# Patient Record
Sex: FEMALE | Race: WHITE | NOT HISPANIC OR LATINO | URBAN - METROPOLITAN AREA
[De-identification: names, ages, dates, MRNs, and addresses within clinical notes are randomized per-mention and may not be internally consistent; named-entity substitution may affect disease eponyms.]

---
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
## 2019-11-18 ENCOUNTER — TRANSCRIPTION ENCOUNTER (OUTPATIENT)
Age: 19
End: 2019-11-18

## 2020-01-15 ENCOUNTER — EMERGENCY (EMERGENCY)
Facility: HOSPITAL | Age: 20
LOS: 1 days | Discharge: HOME | End: 2020-01-15
Attending: EMERGENCY MEDICINE | Admitting: EMERGENCY MEDICINE
Payer: COMMERCIAL

## 2020-01-15 ENCOUNTER — APPOINTMENT (OUTPATIENT)
Dept: INTERNAL MEDICINE | Facility: CLINIC | Age: 20
End: 2020-01-15
Payer: COMMERCIAL

## 2020-01-15 VITALS
TEMPERATURE: 98.2 F | BODY MASS INDEX: 45.75 KG/M2 | RESPIRATION RATE: 14 BRPM | WEIGHT: 268 LBS | OXYGEN SATURATION: 97 % | SYSTOLIC BLOOD PRESSURE: 140 MMHG | HEIGHT: 64 IN | DIASTOLIC BLOOD PRESSURE: 80 MMHG | HEART RATE: 102 BPM

## 2020-01-15 VITALS
TEMPERATURE: 98 F | HEIGHT: 66 IN | SYSTOLIC BLOOD PRESSURE: 152 MMHG | DIASTOLIC BLOOD PRESSURE: 90 MMHG | OXYGEN SATURATION: 99 % | WEIGHT: 268.08 LBS | RESPIRATION RATE: 18 BRPM | HEART RATE: 113 BPM

## 2020-01-15 VITALS
DIASTOLIC BLOOD PRESSURE: 85 MMHG | HEART RATE: 97 BPM | SYSTOLIC BLOOD PRESSURE: 122 MMHG | RESPIRATION RATE: 16 BRPM | OXYGEN SATURATION: 100 % | TEMPERATURE: 98 F

## 2020-01-15 DIAGNOSIS — Z90.49 ACQUIRED ABSENCE OF OTHER SPECIFIED PARTS OF DIGESTIVE TRACT: Chronic | ICD-10-CM

## 2020-01-15 DIAGNOSIS — Z98.890 OTHER SPECIFIED POSTPROCEDURAL STATES: Chronic | ICD-10-CM

## 2020-01-15 DIAGNOSIS — Z90.89 ACQUIRED ABSENCE OF OTHER ORGANS: Chronic | ICD-10-CM

## 2020-01-15 LAB
ALBUMIN SERPL ELPH-MCNC: 4.6 G/DL — SIGNIFICANT CHANGE UP (ref 3.3–5)
ALP SERPL-CCNC: 68 U/L — SIGNIFICANT CHANGE UP (ref 40–120)
ALT FLD-CCNC: 116 U/L — HIGH (ref 12–78)
ANION GAP SERPL CALC-SCNC: 9 MMOL/L — SIGNIFICANT CHANGE UP (ref 5–17)
APPEARANCE UR: CLEAR — SIGNIFICANT CHANGE UP
AST SERPL-CCNC: 57 U/L — HIGH (ref 15–37)
BACTERIA # UR AUTO: ABNORMAL
BASOPHILS # BLD AUTO: 0.04 K/UL — SIGNIFICANT CHANGE UP (ref 0–0.2)
BASOPHILS NFR BLD AUTO: 0.4 % — SIGNIFICANT CHANGE UP (ref 0–2)
BILIRUB SERPL-MCNC: 0.4 MG/DL — SIGNIFICANT CHANGE UP (ref 0.2–1.2)
BILIRUB UR-MCNC: NEGATIVE — SIGNIFICANT CHANGE UP
BUN SERPL-MCNC: 5 MG/DL — LOW (ref 7–23)
CALCIUM SERPL-MCNC: 10.1 MG/DL — SIGNIFICANT CHANGE UP (ref 8.5–10.1)
CHLORIDE SERPL-SCNC: 108 MMOL/L — SIGNIFICANT CHANGE UP (ref 96–108)
CO2 SERPL-SCNC: 23 MMOL/L — SIGNIFICANT CHANGE UP (ref 22–31)
COLOR SPEC: SIGNIFICANT CHANGE UP
CREAT SERPL-MCNC: 0.74 MG/DL — SIGNIFICANT CHANGE UP (ref 0.5–1.3)
DIFF PNL FLD: NEGATIVE — SIGNIFICANT CHANGE UP
EOSINOPHIL # BLD AUTO: 0.2 K/UL — SIGNIFICANT CHANGE UP (ref 0–0.5)
EOSINOPHIL NFR BLD AUTO: 1.8 % — SIGNIFICANT CHANGE UP (ref 0–6)
EPI CELLS # UR: SIGNIFICANT CHANGE UP
GLUCOSE SERPL-MCNC: 91 MG/DL — SIGNIFICANT CHANGE UP (ref 70–99)
GLUCOSE UR QL: NEGATIVE — SIGNIFICANT CHANGE UP
HCT VFR BLD CALC: 39.6 % — SIGNIFICANT CHANGE UP (ref 34.5–45)
HGB BLD-MCNC: 13.2 G/DL — SIGNIFICANT CHANGE UP (ref 11.5–15.5)
IMM GRANULOCYTES NFR BLD AUTO: 0.3 % — SIGNIFICANT CHANGE UP (ref 0–1.5)
KETONES UR-MCNC: ABNORMAL
LEUKOCYTE ESTERASE UR-ACNC: NEGATIVE — SIGNIFICANT CHANGE UP
LYMPHOCYTES # BLD AUTO: 2.94 K/UL — SIGNIFICANT CHANGE UP (ref 1–3.3)
LYMPHOCYTES # BLD AUTO: 26.1 % — SIGNIFICANT CHANGE UP (ref 13–44)
MCHC RBC-ENTMCNC: 28.9 PG — SIGNIFICANT CHANGE UP (ref 27–34)
MCHC RBC-ENTMCNC: 33.3 GM/DL — SIGNIFICANT CHANGE UP (ref 32–36)
MCV RBC AUTO: 86.8 FL — SIGNIFICANT CHANGE UP (ref 80–100)
MONOCYTES # BLD AUTO: 0.73 K/UL — SIGNIFICANT CHANGE UP (ref 0–0.9)
MONOCYTES NFR BLD AUTO: 6.5 % — SIGNIFICANT CHANGE UP (ref 2–14)
NEUTROPHILS # BLD AUTO: 7.32 K/UL — SIGNIFICANT CHANGE UP (ref 1.8–7.4)
NEUTROPHILS NFR BLD AUTO: 64.9 % — SIGNIFICANT CHANGE UP (ref 43–77)
NITRITE UR-MCNC: NEGATIVE — SIGNIFICANT CHANGE UP
NRBC # BLD: 0 /100 WBCS — SIGNIFICANT CHANGE UP (ref 0–0)
PH UR: 5 — SIGNIFICANT CHANGE UP (ref 5–8)
PLATELET # BLD AUTO: 150 K/UL — SIGNIFICANT CHANGE UP (ref 150–400)
POTASSIUM SERPL-MCNC: 3.7 MMOL/L — SIGNIFICANT CHANGE UP (ref 3.5–5.3)
POTASSIUM SERPL-SCNC: 3.7 MMOL/L — SIGNIFICANT CHANGE UP (ref 3.5–5.3)
PROT SERPL-MCNC: 8.9 G/DL — HIGH (ref 6–8.3)
PROT UR-MCNC: NEGATIVE — SIGNIFICANT CHANGE UP
RBC # BLD: 4.56 M/UL — SIGNIFICANT CHANGE UP (ref 3.8–5.2)
RBC # FLD: 13.1 % — SIGNIFICANT CHANGE UP (ref 10.3–14.5)
SODIUM SERPL-SCNC: 140 MMOL/L — SIGNIFICANT CHANGE UP (ref 135–145)
SP GR SPEC: 1 — LOW (ref 1.01–1.02)
UROBILINOGEN FLD QL: NEGATIVE — SIGNIFICANT CHANGE UP
WBC # BLD: 11.26 K/UL — HIGH (ref 3.8–10.5)
WBC # FLD AUTO: 11.26 K/UL — HIGH (ref 3.8–10.5)

## 2020-01-15 PROCEDURE — 85027 COMPLETE CBC AUTOMATED: CPT

## 2020-01-15 PROCEDURE — 99283 EMERGENCY DEPT VISIT LOW MDM: CPT

## 2020-01-15 PROCEDURE — 81001 URINALYSIS AUTO W/SCOPE: CPT

## 2020-01-15 PROCEDURE — 99213 OFFICE O/P EST LOW 20 MIN: CPT

## 2020-01-15 PROCEDURE — 80053 COMPREHEN METABOLIC PANEL: CPT

## 2020-01-15 PROCEDURE — 82962 GLUCOSE BLOOD TEST: CPT

## 2020-01-15 PROCEDURE — 36415 COLL VENOUS BLD VENIPUNCTURE: CPT

## 2020-01-15 RX ORDER — HYDROXYZINE PAMOATE 25 MG/1
25 CAPSULE ORAL
Qty: 120 | Refills: 0 | Status: COMPLETED | COMMUNITY
Start: 2019-12-07

## 2020-01-15 RX ORDER — PAROXETINE HYDROCHLORIDE 10 MG/1
10 TABLET, FILM COATED ORAL
Qty: 30 | Refills: 0 | Status: COMPLETED | COMMUNITY
Start: 2019-12-07

## 2020-01-15 RX ORDER — PAROXETINE HYDROCHLORIDE 20 MG/1
20 TABLET, FILM COATED ORAL
Qty: 30 | Refills: 0 | Status: COMPLETED | COMMUNITY
Start: 2019-12-28

## 2020-01-15 RX ORDER — ALPRAZOLAM 0.25 MG
0.5 TABLET ORAL ONCE
Refills: 0 | Status: DISCONTINUED | OUTPATIENT
Start: 2020-01-15 | End: 2020-01-15

## 2020-01-15 RX ADMIN — Medication 0.5 MILLIGRAM(S): at 19:47

## 2020-01-15 NOTE — HISTORY OF PRESENT ILLNESS
[FreeTextEntry8] :  19F presents with episode of hypoglycemia. Reported white fingertips and dizziness. Took her BS which was 70. Episode improved after eating. She is on metformin and januvia. Notes she has been eating healthier and losing weight. \par

## 2020-01-15 NOTE — ED ADULT NURSE NOTE - NSIMPLEMENTINTERV_GEN_ALL_ED
Implemented All Universal Safety Interventions:  [place] to call system. Call bell, personal items and telephone within reach. Instruct patient to call for assistance. Room bathroom lighting operational. Non-slip footwear when patient is off stretcher. Physically safe environment: no spills, clutter or unnecessary equipment. Stretcher in lowest position, wheels locked, appropriate side rails in place.

## 2020-01-15 NOTE — ED PROVIDER NOTE - PATIENT PORTAL LINK FT
You can access the FollowMyHealth Patient Portal offered by [facility] by registering at the following website: [email or web address]. By joining [employer]’s FollowMyHealth portal, you will also be able to view your health information using other applications (apps) compatible with our system.

## 2020-01-15 NOTE — ASSESSMENT
[FreeTextEntry1] : DM2: Recommended she hold januvia until A1c resulted. Treatment based on results. \par

## 2020-01-15 NOTE — ED ADULT NURSE NOTE - OBJECTIVE STATEMENT
Pt p/w intermittent hypoglycemia x 2 days. per patient has had low blood sugars in the 60s despite not taking her hyperglycemia meds. Pt reports that she has also been working out and fasting intermittently. Seen by PMD today told to stop taking januvia, per patient she ate an orange today and a salad and after several hours her blood sugar was 63.

## 2020-01-15 NOTE — ED ADULT NURSE NOTE - PAIN RATING/NUMBER SCALE (0-10): REST
14 y/o F with angioedema v anaphylaxis    f/u allergy/immunology consult  steroids per A&I  send labs per A&I  change H2 blocker, benadryl to PO  wean O2 as tolerated  PO ad tiffanie regular diet  DC IVF  transfer to  0

## 2020-01-15 NOTE — ED PROVIDER NOTE - PROGRESS NOTE DETAILS
Pt states she is feeling anxious. Takes Klonopin at home. Will given dose of Xanax here while workup in progress. Pt feeling better. Labs unremarkable. Pt to f/u with endo/pcp tomorrow.  Results and plan discussed with pt and father- verbalize agreement and understanding of plan. Stable for DC home. No emergent concerns at this time.

## 2020-01-15 NOTE — ED PROVIDER NOTE - OBJECTIVE STATEMENT
18 yo F PMHx HTN, DM2, obesity presents to ED c/o hypoglycemia x a few days. Pt reports multiple low blood glucose levels. Admits to intermittently fasting. Today pt states she had an orange in the AM and a salad at about 3:30. Throughout the day FS noted to be around high 60s. Contacted her endocrinology to see if she could come into the office for evaluation/possible medication adjustment but was instead advised to come into the ED. Pt currently asymptomatic. FS in triage 90  PCP- Claudio Burgos

## 2020-01-15 NOTE — ED PROVIDER NOTE - NSFOLLOWUPINSTRUCTIONS_ED_ALL_ED_FT
Follow up with your PCP/endocrinologist tomorrow.  Make sure that you eat at regular intervals.  Return to ED immediately for new or worsening symptoms.

## 2020-01-15 NOTE — REVIEW OF SYSTEMS
[Fever] : no fever [Chills] : no chills [Night Sweats] : no night sweats [Discharge] : no discharge [Itching] : no itching [Vision Problems] : no vision problems [Chest Pain] : no chest pain [Palpitations] : no palpitations [Lower Ext Edema] : no lower extremity edema [Shortness Of Breath] : no shortness of breath [Wheezing] : no wheezing [Dyspnea on Exertion] : no dyspnea on exertion [Cough] : no cough [Abdominal Pain] : no abdominal pain [Nausea] : no nausea [Constipation] : no constipation [Diarrhea] : diarrhea [Vomiting] : no vomiting [Dysuria] : no dysuria

## 2020-01-15 NOTE — PHYSICAL EXAM
[Normal Sclera/Conjunctiva] : normal sclera/conjunctiva [No Acute Distress] : no acute distress [PERRL] : pupils equal round and reactive to light [EOMI] : extraocular movements intact [No Respiratory Distress] : no respiratory distress  [No Accessory Muscle Use] : no accessory muscle use [Clear to Auscultation] : lungs were clear to auscultation bilaterally [Normal Rate] : normal rate  [Regular Rhythm] : with a regular rhythm [Normal S1, S2] : normal S1 and S2 [Soft] : abdomen soft [Non Tender] : non-tender [Non-distended] : non-distended [Normal Bowel Sounds] : normal bowel sounds

## 2020-01-15 NOTE — ED PROVIDER NOTE - ATTENDING CONTRIBUTION TO CARE
I have personally performed a face to face diagnostic evaluation on this patient.  I have reviewed the PA note and agree with the history, exam, and plan of care, except as noted.  History and Exam by me shows patient c/o hypoglycemia, states she has been checking her finger stick for past few days, has read 72, 83 and today 62, patient states she has been dieting, going to the gym, today patient feeling anxious, hyperventilating, fingernails turning blue, patient obese, alert and oriented, anxious, tearful, heart tachycardic, lungs clear, abdomen soft, no pedal edema, f/u labs, feed the patient, xanax, re-eval.

## 2020-01-16 LAB
ALBUMIN SERPL ELPH-MCNC: 5.3 G/DL
ALP BLD-CCNC: 62 U/L
ALT SERPL-CCNC: 91 U/L
ANION GAP SERPL CALC-SCNC: 17 MMOL/L
AST SERPL-CCNC: 42 U/L
BASOPHILS # BLD AUTO: 0.04 K/UL
BASOPHILS NFR BLD AUTO: 0.4 %
BILIRUB SERPL-MCNC: 0.5 MG/DL
BUN SERPL-MCNC: 6 MG/DL
CALCIUM SERPL-MCNC: 10.5 MG/DL
CHLORIDE SERPL-SCNC: 101 MMOL/L
CHOLEST SERPL-MCNC: 133 MG/DL
CHOLEST/HDLC SERPL: 3.7 RATIO
CO2 SERPL-SCNC: 20 MMOL/L
CREAT SERPL-MCNC: 0.69 MG/DL
CREAT SPEC-SCNC: 77 MG/DL
EOSINOPHIL # BLD AUTO: 0.23 K/UL
EOSINOPHIL NFR BLD AUTO: 2 %
ESTIMATED AVERAGE GLUCOSE: 111 MG/DL
GLUCOSE SERPL-MCNC: 96 MG/DL
HBA1C MFR BLD HPLC: 5.5 %
HCT VFR BLD CALC: 39.3 %
HDLC SERPL-MCNC: 36 MG/DL
HGB BLD-MCNC: 13.3 G/DL
IMM GRANULOCYTES NFR BLD AUTO: 0.2 %
INSULIN SERPL-MCNC: 44.3 UU/ML
LDLC SERPL CALC-MCNC: 77 MG/DL
LYMPHOCYTES # BLD AUTO: 2.69 K/UL
LYMPHOCYTES NFR BLD AUTO: 23.8 %
MAN DIFF?: NORMAL
MCHC RBC-ENTMCNC: 29.8 PG
MCHC RBC-ENTMCNC: 33.8 GM/DL
MCV RBC AUTO: 87.9 FL
MICROALBUMIN 24H UR DL<=1MG/L-MCNC: 2.5 MG/DL
MICROALBUMIN/CREAT 24H UR-RTO: 32 MG/G
MONOCYTES # BLD AUTO: 0.81 K/UL
MONOCYTES NFR BLD AUTO: 7.2 %
NEUTROPHILS # BLD AUTO: 7.5 K/UL
NEUTROPHILS NFR BLD AUTO: 66.4 %
PLATELET # BLD AUTO: 203 K/UL
POTASSIUM SERPL-SCNC: 4.4 MMOL/L
PROT SERPL-MCNC: 7.9 G/DL
RBC # BLD: 4.47 M/UL
RBC # FLD: 13.1 %
SODIUM SERPL-SCNC: 139 MMOL/L
TRIGL SERPL-MCNC: 99 MG/DL
WBC # FLD AUTO: 11.29 K/UL

## 2020-02-05 NOTE — PATIENT PROFILE PEDIATRIC. - FUNCTIONAL SCREEN CURRENT LEVEL: COMMUNICATION, MLM
Skin normal color for race, warm, dry and intact. No evidence of rash. (0) understands/communicates without difficulty

## 2020-02-22 NOTE — ED PEDIATRIC TRIAGE NOTE - TEMPERATURE IN FAHRENHEIT (DEGREES F)
24 y.o. female with past medical history significant for asthma who presents from private vehicle with chief complaint of abdominal pain. Pt c/o sharp LUQ and LLQ abdominal pain that began on 20. Pt notes accompanying nausea, dysuria, and back pain. Pt also reports diarrhea. Pt states she drank a magnesium citrate and passed a bowel movement, which did not relieve her abdominal pain. Pt states she has an IUD in place. Pt mentions she is \"prone to UTI and yeast infections\". Pt endorses previous surgical hx of . Pt denies vomiting, hematuria, or urinary frequency. There are no other acute medical concerns at this time. Significant FMHx: Family hx of crohn's disease and diverticulitis. Note written by Carissa Mtz, as dictated by Jayce Beckham MD 4:48 PM        The history is provided by the patient and a relative. No  was used. Past Medical History:   Diagnosis Date    Asthma     Nexplanon removal     Inserted 2015 and Removed 2016       History reviewed. No pertinent surgical history.       Family History:   Problem Relation Age of Onset    Hypertension Mother        Social History     Socioeconomic History    Marital status: SINGLE     Spouse name: Not on file    Number of children: Not on file    Years of education: Not on file    Highest education level: Not on file   Occupational History    Not on file   Social Needs    Financial resource strain: Not on file    Food insecurity:     Worry: Not on file     Inability: Not on file    Transportation needs:     Medical: Not on file     Non-medical: Not on file   Tobacco Use    Smoking status: Current Every Day Smoker     Packs/day: 0.25     Types: Cigarettes    Smokeless tobacco: Never Used    Tobacco comment: Smokes 4-5 cigs per day - Uses vapor occasional    Substance and Sexual Activity    Alcohol use: No     Alcohol/week: 0.0 standard drinks    Drug use: No    Sexual activity: Yes     Partners: Male     Birth control/protection: Pill   Lifestyle    Physical activity:     Days per week: Not on file     Minutes per session: Not on file    Stress: Not on file   Relationships    Social connections:     Talks on phone: Not on file     Gets together: Not on file     Attends Scientologist service: Not on file     Active member of club or organization: Not on file     Attends meetings of clubs or organizations: Not on file     Relationship status: Not on file    Intimate partner violence:     Fear of current or ex partner: Not on file     Emotionally abused: Not on file     Physically abused: Not on file     Forced sexual activity: Not on file   Other Topics Concern    Not on file   Social History Narrative    Not on file         ALLERGIES: Patient has no known allergies. Review of Systems   Constitutional: Negative for chills and fever. Respiratory: Negative for shortness of breath. Cardiovascular: Negative for chest pain. Gastrointestinal: Positive for abdominal pain, diarrhea and nausea. Negative for constipation and vomiting. Genitourinary: Positive for dysuria. Negative for frequency and hematuria. Musculoskeletal: Positive for back pain. Neurological: Negative for dizziness and light-headedness. All other systems reviewed and are negative. Vitals:    02/22/20 1639 02/22/20 1702   BP: 116/68    Pulse: 96    Resp: 18    Temp: 97.7 °F (36.5 °C)    SpO2: 99% 98%   Weight: 49.9 kg (110 lb)    Height: 5' (1.524 m)             Physical Exam  Vitals signs and nursing note reviewed. Constitutional:       Appearance: She is well-developed. HENT:      Head: Normocephalic and atraumatic. Eyes:      Pupils: Pupils are equal, round, and reactive to light. Neck:      Musculoskeletal: Normal range of motion and neck supple. Cardiovascular:      Rate and Rhythm: Normal rate and regular rhythm.    Pulmonary:      Effort: Pulmonary effort is normal.      Breath sounds: Normal breath sounds. Abdominal:      General: There is no distension. Palpations: Abdomen is soft. Tenderness: There is abdominal tenderness. Comments: Minimal LLQ tenderness. Skin:     General: Skin is warm and dry. Capillary Refill: Capillary refill takes less than 2 seconds. Neurological:      Mental Status: She is alert and oriented to person, place, and time. Psychiatric:         Behavior: Behavior normal.     Note written by Carissa Joiner, as dictated by Candiss Merlin, MD 4:48 PM       MDM  Number of Diagnoses or Management Options  Abdominal pain, LLQ (left lower quadrant): established and worsening  Diagnosis management comments: The patient is resting comfortably and feels better, is alert and in no distress. The repeat examination is unremarkable and benign; in particular, there is no discomfort at McBurney's point. The history, exam, diagnostic testing, and current condition do not suggest acute appendicitis, bowel obstruction, incarcerated hernia, acute cholecystitis, bowel perforation, major gastrointestinal bleeding, severe diverticulitis, sepsis, or other significant pathology to warrant further testing, continued ED treatment, admission, or surgical evaluation at this point. The vital signs have been stable and are within normal limits at this time. The patient does not have uncontrollable pain, intractable vomiting, or other significant symptoms. The patient's condition is stable and appropriate for discharge. The patient will pursue further outpatient evaluation with the gynecologist (malpositioned IUD) and gastroenterologist as indicated in the discharge instructions.            Procedures 97.5

## 2020-04-29 NOTE — ED PROVIDER NOTE - NS CRAFFT PART A3 ALCOHOL
Patient called back and is rescheduled for 5/18. Patient asked if her surgery is still scheduled. I informed patient from what I see surgery is scheduled and if something changes she will receive a call.    No

## 2020-10-02 NOTE — PRE-OP CHECKLIST, PEDIATRIC - ALLERGIES REVIEWED
done
no loss of consciousness, no gait abnormality, no headache, no sensory deficits, and no weakness.

## 2020-12-15 PROBLEM — Z86.69 HISTORY OF ACUTE OTITIS MEDIA: Status: RESOLVED | Noted: 2017-12-16 | Resolved: 2020-12-15

## 2020-12-15 PROBLEM — Z87.09 HISTORY OF SORE THROAT: Status: RESOLVED | Noted: 2017-06-10 | Resolved: 2020-12-15

## 2020-12-16 ENCOUNTER — APPOINTMENT (OUTPATIENT)
Dept: INTERNAL MEDICINE | Facility: CLINIC | Age: 20
End: 2020-12-16
Payer: COMMERCIAL

## 2020-12-16 DIAGNOSIS — Z11.3 ENCOUNTER FOR SCREENING FOR INFECTIONS WITH A PREDOMINANTLY SEXUAL MODE OF TRANSMISSION: ICD-10-CM

## 2020-12-16 DIAGNOSIS — A60.00 HERPESVIRAL INFECTION OF UROGENITAL SYSTEM, UNSPECIFIED: ICD-10-CM

## 2020-12-16 DIAGNOSIS — J02.8 ACUTE PHARYNGITIS DUE TO OTHER SPECIFIED ORGANISMS: ICD-10-CM

## 2020-12-16 DIAGNOSIS — B96.89 ACUTE PHARYNGITIS DUE TO OTHER SPECIFIED ORGANISMS: ICD-10-CM

## 2020-12-16 PROCEDURE — 99214 OFFICE O/P EST MOD 30 MIN: CPT | Mod: 95

## 2020-12-16 NOTE — REVIEW OF SYSTEMS
[Nasal Discharge] : nasal discharge [Sore Throat] : sore throat [Fever] : no fever [Chills] : no chills [Night Sweats] : no night sweats [Chest Pain] : no chest pain [Palpitations] : no palpitations [Lower Ext Edema] : no lower extremity edema [Shortness Of Breath] : no shortness of breath [Wheezing] : no wheezing [Cough] : no cough [Dyspnea on Exertion] : no dyspnea on exertion [Abdominal Pain] : no abdominal pain [Nausea] : no nausea [Constipation] : no constipation [Diarrhea] : diarrhea [Vomiting] : no vomiting [Dysuria] : no dysuria

## 2020-12-16 NOTE — PHYSICAL EXAM
[No Acute Distress] : no acute distress [Normal Sclera/Conjunctiva] : normal sclera/conjunctiva [EOMI] : extraocular movements intact [No Respiratory Distress] : no respiratory distress  [No Accessory Muscle Use] : no accessory muscle use [Normal Affect] : the affect was normal [Normal Insight/Judgement] : insight and judgment were intact

## 2020-12-16 NOTE — ASSESSMENT
[FreeTextEntry1] : Pharyngitis: Patient denies oral lesions and symptoms have been persistent for a week. Unable to check for strep. Start amoxicillin empirically. \par Genital herpes: Start valacyclovir BID until lesions resolved. Check STD testing and repeat in 1 month. \par

## 2020-12-16 NOTE — HISTORY OF PRESENT ILLNESS
[Home] : at home, [unfilled] , at the time of the visit. [Medical Office: ([facility])___] : at the medical office located in  [Verbal consent obtained from patient] : the patient, [unfilled] [Initial Evaluation] : an initial evaluation of [Sore Throat] : sore throat [Odynophagia] : odynophagia [Nasal Congestion] : nasal congestion [Postnasal Drainage] : postnasal drainage [Currently Experiencing] : The patient is currently experiencing symptoms. 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

## 2020-12-21 LAB
HIV1+2 AB SPEC QL IA.RAPID: NONREACTIVE
HSV 1+2 IGG SER IA-IMP: POSITIVE
HSV 1+2 IGG SER IA-IMP: POSITIVE
HSV1 IGG SER QL: 41.6 INDEX
HSV1 IGM SER QL: NORMAL TITER
HSV2 AB FLD-ACNC: NORMAL TITER
HSV2 IGG SER QL: 7.54 INDEX
T PALLIDUM AB SER QL IA: NEGATIVE

## 2021-06-16 ENCOUNTER — EMERGENCY (EMERGENCY)
Facility: HOSPITAL | Age: 21
LOS: 1 days | Discharge: HOME | End: 2021-06-16
Attending: EMERGENCY MEDICINE | Admitting: EMERGENCY MEDICINE
Payer: COMMERCIAL

## 2021-06-16 VITALS
HEIGHT: 66 IN | TEMPERATURE: 98 F | SYSTOLIC BLOOD PRESSURE: 173 MMHG | HEART RATE: 117 BPM | DIASTOLIC BLOOD PRESSURE: 110 MMHG | RESPIRATION RATE: 18 BRPM | OXYGEN SATURATION: 96 % | WEIGHT: 293 LBS

## 2021-06-16 VITALS — DIASTOLIC BLOOD PRESSURE: 98 MMHG | SYSTOLIC BLOOD PRESSURE: 178 MMHG

## 2021-06-16 DIAGNOSIS — Z90.89 ACQUIRED ABSENCE OF OTHER ORGANS: Chronic | ICD-10-CM

## 2021-06-16 DIAGNOSIS — Z98.890 OTHER SPECIFIED POSTPROCEDURAL STATES: Chronic | ICD-10-CM

## 2021-06-16 DIAGNOSIS — F60.3 BORDERLINE PERSONALITY DISORDER: ICD-10-CM

## 2021-06-16 DIAGNOSIS — F43.23 ADJUSTMENT DISORDER WITH MIXED ANXIETY AND DEPRESSED MOOD: ICD-10-CM

## 2021-06-16 DIAGNOSIS — Z90.49 ACQUIRED ABSENCE OF OTHER SPECIFIED PARTS OF DIGESTIVE TRACT: Chronic | ICD-10-CM

## 2021-06-16 LAB
ALBUMIN SERPL ELPH-MCNC: 4.1 G/DL — SIGNIFICANT CHANGE UP (ref 3.3–5)
ALP SERPL-CCNC: 74 U/L — SIGNIFICANT CHANGE UP (ref 40–120)
ALT FLD-CCNC: 60 U/L — SIGNIFICANT CHANGE UP (ref 12–78)
ANION GAP SERPL CALC-SCNC: 7 MMOL/L — SIGNIFICANT CHANGE UP (ref 5–17)
APAP SERPL-MCNC: <2 UG/ML — LOW (ref 10–30)
APPEARANCE UR: CLEAR — SIGNIFICANT CHANGE UP
AST SERPL-CCNC: 45 U/L — HIGH (ref 15–37)
BASOPHILS # BLD AUTO: 0.05 K/UL — SIGNIFICANT CHANGE UP (ref 0–0.2)
BASOPHILS NFR BLD AUTO: 0.3 % — SIGNIFICANT CHANGE UP (ref 0–2)
BILIRUB SERPL-MCNC: 0.4 MG/DL — SIGNIFICANT CHANGE UP (ref 0.2–1.2)
BILIRUB UR-MCNC: NEGATIVE — SIGNIFICANT CHANGE UP
BUN SERPL-MCNC: 9 MG/DL — SIGNIFICANT CHANGE UP (ref 7–23)
CALCIUM SERPL-MCNC: 9.1 MG/DL — SIGNIFICANT CHANGE UP (ref 8.5–10.1)
CHLORIDE SERPL-SCNC: 106 MMOL/L — SIGNIFICANT CHANGE UP (ref 96–108)
CO2 SERPL-SCNC: 26 MMOL/L — SIGNIFICANT CHANGE UP (ref 22–31)
COLOR SPEC: YELLOW — SIGNIFICANT CHANGE UP
CREAT SERPL-MCNC: 0.56 MG/DL — SIGNIFICANT CHANGE UP (ref 0.5–1.3)
DIFF PNL FLD: ABNORMAL
EOSINOPHIL # BLD AUTO: 0.18 K/UL — SIGNIFICANT CHANGE UP (ref 0–0.5)
EOSINOPHIL NFR BLD AUTO: 1 % — SIGNIFICANT CHANGE UP (ref 0–6)
ETHANOL SERPL-MCNC: <10 MG/DL — SIGNIFICANT CHANGE UP (ref 0–10)
GLUCOSE SERPL-MCNC: 135 MG/DL — HIGH (ref 70–99)
GLUCOSE UR QL: NEGATIVE — SIGNIFICANT CHANGE UP
HCG SERPL-ACNC: <1 MIU/ML — SIGNIFICANT CHANGE UP
HCT VFR BLD CALC: 41.1 % — SIGNIFICANT CHANGE UP (ref 34.5–45)
HGB BLD-MCNC: 14 G/DL — SIGNIFICANT CHANGE UP (ref 11.5–15.5)
IMM GRANULOCYTES NFR BLD AUTO: 0.3 % — SIGNIFICANT CHANGE UP (ref 0–1.5)
KETONES UR-MCNC: NEGATIVE — SIGNIFICANT CHANGE UP
LEUKOCYTE ESTERASE UR-ACNC: NEGATIVE — SIGNIFICANT CHANGE UP
LYMPHOCYTES # BLD AUTO: 19.2 % — SIGNIFICANT CHANGE UP (ref 13–44)
LYMPHOCYTES # BLD AUTO: 3.35 K/UL — HIGH (ref 1–3.3)
MCHC RBC-ENTMCNC: 28.8 PG — SIGNIFICANT CHANGE UP (ref 27–34)
MCHC RBC-ENTMCNC: 34.1 GM/DL — SIGNIFICANT CHANGE UP (ref 32–36)
MCV RBC AUTO: 84.6 FL — SIGNIFICANT CHANGE UP (ref 80–100)
MONOCYTES # BLD AUTO: 0.78 K/UL — SIGNIFICANT CHANGE UP (ref 0–0.9)
MONOCYTES NFR BLD AUTO: 4.5 % — SIGNIFICANT CHANGE UP (ref 2–14)
NEUTROPHILS # BLD AUTO: 13.08 K/UL — HIGH (ref 1.8–7.4)
NEUTROPHILS NFR BLD AUTO: 74.7 % — SIGNIFICANT CHANGE UP (ref 43–77)
NITRITE UR-MCNC: NEGATIVE — SIGNIFICANT CHANGE UP
NRBC # BLD: 0 /100 WBCS — SIGNIFICANT CHANGE UP (ref 0–0)
PCP SPEC-MCNC: SIGNIFICANT CHANGE UP
PH UR: 7 — SIGNIFICANT CHANGE UP (ref 5–8)
PLATELET # BLD AUTO: 178 K/UL — SIGNIFICANT CHANGE UP (ref 150–400)
POTASSIUM SERPL-MCNC: 4 MMOL/L — SIGNIFICANT CHANGE UP (ref 3.5–5.3)
POTASSIUM SERPL-SCNC: 4 MMOL/L — SIGNIFICANT CHANGE UP (ref 3.5–5.3)
PROT SERPL-MCNC: 8.5 G/DL — HIGH (ref 6–8.3)
PROT UR-MCNC: 30 MG/DL
RBC # BLD: 4.86 M/UL — SIGNIFICANT CHANGE UP (ref 3.8–5.2)
RBC # FLD: 12.5 % — SIGNIFICANT CHANGE UP (ref 10.3–14.5)
SALICYLATES SERPL-MCNC: <1.7 MG/DL — LOW (ref 2.8–20)
SARS-COV-2 RNA SPEC QL NAA+PROBE: SIGNIFICANT CHANGE UP
SODIUM SERPL-SCNC: 139 MMOL/L — SIGNIFICANT CHANGE UP (ref 135–145)
SP GR SPEC: 1.01 — SIGNIFICANT CHANGE UP (ref 1.01–1.02)
UROBILINOGEN FLD QL: NEGATIVE — SIGNIFICANT CHANGE UP
WBC # BLD: 17.49 K/UL — HIGH (ref 3.8–10.5)
WBC # FLD AUTO: 17.49 K/UL — HIGH (ref 3.8–10.5)

## 2021-06-16 PROCEDURE — 99285 EMERGENCY DEPT VISIT HI MDM: CPT | Mod: 25

## 2021-06-16 PROCEDURE — 71045 X-RAY EXAM CHEST 1 VIEW: CPT | Mod: 26

## 2021-06-16 PROCEDURE — 80307 DRUG TEST PRSMV CHEM ANLYZR: CPT

## 2021-06-16 PROCEDURE — U0003: CPT

## 2021-06-16 PROCEDURE — 90792 PSYCH DIAG EVAL W/MED SRVCS: CPT | Mod: 95

## 2021-06-16 PROCEDURE — 84702 CHORIONIC GONADOTROPIN TEST: CPT

## 2021-06-16 PROCEDURE — 71045 X-RAY EXAM CHEST 1 VIEW: CPT

## 2021-06-16 PROCEDURE — 36415 COLL VENOUS BLD VENIPUNCTURE: CPT

## 2021-06-16 PROCEDURE — 99285 EMERGENCY DEPT VISIT HI MDM: CPT

## 2021-06-16 PROCEDURE — 80053 COMPREHEN METABOLIC PANEL: CPT

## 2021-06-16 PROCEDURE — 81001 URINALYSIS AUTO W/SCOPE: CPT

## 2021-06-16 PROCEDURE — 93005 ELECTROCARDIOGRAM TRACING: CPT

## 2021-06-16 PROCEDURE — U0005: CPT

## 2021-06-16 PROCEDURE — 85025 COMPLETE CBC W/AUTO DIFF WBC: CPT

## 2021-06-16 PROCEDURE — 93010 ELECTROCARDIOGRAM REPORT: CPT

## 2021-06-16 RX ORDER — AMLODIPINE BESYLATE 2.5 MG/1
5 TABLET ORAL ONCE
Refills: 0 | Status: COMPLETED | OUTPATIENT
Start: 2021-06-16 | End: 2021-06-16

## 2021-06-16 RX ORDER — CLONAZEPAM 1 MG
0.5 TABLET ORAL ONCE
Refills: 0 | Status: DISCONTINUED | OUTPATIENT
Start: 2021-06-16 | End: 2021-06-16

## 2021-06-16 RX ORDER — SPIRONOLACTONE 25 MG/1
50 TABLET, FILM COATED ORAL ONCE
Refills: 0 | Status: COMPLETED | OUTPATIENT
Start: 2021-06-16 | End: 2021-06-16

## 2021-06-16 RX ADMIN — Medication 0.5 MILLIGRAM(S): at 22:18

## 2021-06-16 RX ADMIN — AMLODIPINE BESYLATE 5 MILLIGRAM(S): 2.5 TABLET ORAL at 18:54

## 2021-06-16 RX ADMIN — AMLODIPINE BESYLATE 5 MILLIGRAM(S): 2.5 TABLET ORAL at 22:18

## 2021-06-16 RX ADMIN — SPIRONOLACTONE 50 MILLIGRAM(S): 25 TABLET, FILM COATED ORAL at 19:03

## 2021-06-16 NOTE — ED PROVIDER NOTE - PATIENT PORTAL LINK FT
You can access the FollowMyHealth Patient Portal offered by [facility] by registering at the following website: [email or web address]. By joining [employer]’s FollowMyHealth portal, you will also be able to view your health information using other applications (apps) compatible with our system.

## 2021-06-16 NOTE — ED PROVIDER NOTE - PROVIDER TOKENS
FREE:[LAST:[YOUR PSYCHIATRIST],PHONE:[(   )    -],FAX:[(   )    -],FOLLOWUP:[1-3 Days]],FREE:[LAST:[YOUR THERAPIST],PHONE:[(   )    -],FAX:[(   )    -],FOLLOWUP:[1-3 Days]]

## 2021-06-16 NOTE — ED BEHAVIORAL HEALTH ASSESSMENT NOTE - DIFFERENTIAL
Adjustment Disorder with mixed anxiety and depressed mood; Borderline Personality Disorder; much lower on the differential is MDD although patient denies recent symptoms consistent with a major depressive episode

## 2021-06-16 NOTE — ED ADULT NURSE NOTE - OBJECTIVE STATEMENT
Pt. received alert and oriented x3 with chief complaint of suicidal thoughts w/ increased depression in past few days. Pt. denies suicidal attempt.

## 2021-06-16 NOTE — ED PROVIDER NOTE - CARE PROVIDER_API CALL
YOUR PSYCHIATRIST,   Phone: (   )    -  Fax: (   )    -  Follow Up Time: 1-3 Days    YOUR THERAPIST,   Phone: (   )    -  Fax: (   )    -  Follow Up Time: 1-3 Days

## 2021-06-16 NOTE — ED PROVIDER NOTE - CARE PLAN
Principal Discharge DX:	MDD (major depressive disorder)   Principal Discharge DX:	Depression  Secondary Diagnosis:	Suicidal ideations

## 2021-06-16 NOTE — ED PROVIDER NOTE - OBJECTIVE STATEMENT
22 y/o F with hx of DM, HTN, depression, anxiety, BPD presents with c/o suicidal thoughts today. Pt with father at bedside, states that 22 y/o F with hx of DM, HTN, depression, anxiety, BPD presents with c/o suicidal thoughts today. Pt with father at bedside, states that her boyfriend broke up with her today and pt got very upset, spoke with her therapist today and told him that she was very depressed and had suicidal ideations. Therapist spoke with father after and advised to bring pt to ED for evaluation. Pt states that she was thinking about taking a bottle of pills but states that she never made any attempts and cannot do that since she loves her family. Pt states that she has been more depressed lately due to issues with her boyfriend. Pt used to cut herself in the past and has been seen in ED for psych eval but denies has had psychiatric admissions. Pt denies HI, CP, SOB, dizziness, headache, vision changes, hallucinations, fever, cough. States that she had 1&1/2 bottles of beer this morning. Denies other drug abuse. Pt states that she also has forgotten to take her medications for a week and hence has not taken her BP medications.

## 2021-06-16 NOTE — ED BEHAVIORAL HEALTH ASSESSMENT NOTE - VIOLENCE PROTECTIVE FACTORS:
Residential stability/Relationship stability/Employment stability/Sobriety/Engagement in treatment/Good treatment response/compliance

## 2021-06-16 NOTE — ED BEHAVIORAL HEALTH ASSESSMENT NOTE - REFERRAL / APPOINTMENT DETAILS
patient to follow up with outpatient therapist, Herman Lazar ([phone]) and increase frequency of sessions to once per week; will also follow up with prescribing provider, Latonia

## 2021-06-16 NOTE — ED BEHAVIORAL HEALTH ASSESSMENT NOTE - RISK ASSESSMENT
Patient is not at imminent risk of harm to herself as she is now denying suicidal ideation, intent or plan.  She has no history of SA, is help-seeking, future-oriented, and hopeful about feeling better in the future. She is currently engaged in outpatient treatment (reports therapist has been utilizing DBT-informed treatment modality with her, which she has found useful). Danika completed a safety plan identifying warning signs and coping strategies she identified as being useful to her. Sari's father feels comfortable with her returning home at this time and with plan for Sari to continue treatment in outpatient care. Although Sari remains at chronically elevated risk for impulsive behavior given past history and ongoing psychosocial stressors, she is at low imminent risk for harm to self or others at this time as she is adherent to treatment, future-oriented, help-seeking, calm, cooperative and in no acute distress at this time and identifies various reasons for wanting to live. She is currently at low acute risk and does not require inpatient psychiatric hospitalization at this time. She is currently clinically stable for outpatient treatment. Safety plan reviewed with patient as well as instructions to return to ED or call 911 if feeling unsafe. Low Acute Suicide Risk

## 2021-06-16 NOTE — ED PROVIDER NOTE - NSFOLLOWUPINSTRUCTIONS_ED_ALL_ED_FT
Follow up with your therapist weekly and psychiatrist as discussed in 1-2 days for re-evaluation, ongoing care and treatment. If having worsening of symptoms or other related symptoms, RETURN TO THE ER IMMEDIATELY.

## 2021-06-16 NOTE — ED BEHAVIORAL HEALTH NOTE - BEHAVIORAL HEALTH NOTE
===================  PRE-HOSPITAL COURSE  ===================  SOURCE:  KRYSTEN Garza and secondhand ED documentation.  DETAILS:  Patient was BIB father due to patient expressing     ============  ED COURSE   ============  SOURCE:  KRYSTEN Garza and secondhand ED documentation.  ARRIVAL:  Per chart, patient arrived as a walk-in. Patient was calm and compliant with triage process upon arrival.   BELONGINGS:  Patient arrived with clothing, which have been provided to hospital security. Patient is currently in a hospital gown with a 1:1.  BEHAVIOR: RN described patient to present with linear thought process with thought content WNL, expressing anxious mood with appropriate affect, presenting as AAOx3, currently in good behavioral and impulse control, not violent or aggressive,. Per RN, patient is reporting SI and was forthcoming about how she was thinking of OD on pills, and was currently denying any intent. RN stated patient is denying HI/A/VH, and did not present with visible marks/bruises in the ED upon physical exam. RN stated patient is not c/o physical pain, and is currently sitting in her hospital bed. RN stated patient currently has HBP which may be related to patient feeling anxious. RN stated patient appears well-groomed, reports good ADLs, and maintains good hygiene.  TREATMENT:  Per chart and RN, patient was not provided any PRN medications in the ED.   VISITORS:  RN stated patient is accompanied by her father in the ED; stated father is interacting pleasantly with the patient and remains appropriate in the ED. No conflicts/arguments noted.    ========================  FOR EACH COLLATERAL  ========================  NAME: Sundar Grayson  NUMBER: [phone]  RELATIONSHIP: Father  RELIABILITY: High  COMMENTS:     [facility] attempted to reach psychologist Herman Talbot [phone] however they were unavailable. BT was able to leave a v/m requesting a call back.     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    ========================  HPI  BASELINE FUNCTIONING: Mr. 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 Patient has been in a committed heterosexual relationship for the last 2.5 yrs in which Mr. Grayson reported was a healthy relationship with no presence of physical, sexual, or verbal abuse. Mr. Grayson stated patient has been seeing psychologist Herman Talbot for several years and meets with a psychiatrist on a monthly basis, reports she takes Pristiq 50MG QD and is medically compliant. Mr. Grayson reports patient has a good relationship with her family members at home, and socializes well with others.   DATE HPI STARTED: 6/16/2021  DECOMPENSATION: Per Mr. Grayson, patient has been having arguments with her boyfriend which led to boyfriend breaking up with her this morning. Patient had reportedly came back from a vacation in [place] then the boyfriend asked for some space and ended the relationship. Mr. Grayson stated patient was speaking with him and noticed patient was feeling unwell. Patient then expressed SI to him, which prompted Mr. Grayson to consult with patient’s psychologist Herman Talbot, who recommended patient to visit the ED for emergency psych evaluation.   SUICIDALITY: Mr. Grayson stated patient expressed SI, stated “I don’t know how to go on”, however Mr. Grayson did not report patient expressed a plan to him at time of decompensation. Mr. Grayson stated patient did not make any self-injurious behaviors/SA.  VIOLENCE:  None  SUBSTANCE:  None?    ========================  PAST PSYCHIATRIC HISTORY  ========================  DATE PAST PSYCHIATRIC HISTORY STARTED: Mr. Grayson stated several years ago  MAIN PSYCHIATRIC DIAGNOSIS: Mr. Grayson reported patient has a Dx/o Borderline Personality Disorder, depression, and anxiety.  PSYCHIATRIC HOSPITALIZATIONS:  None; patient has one other ED visit from when patient was pediatric due to cutting. Patient was not admitted.  PRIOR ILLNESS: None?  SUICIDALITY:  Mr. Grayson reported patient has not expressed SI to him in the past however has a hx/o cutting which prompted patient to visit the ED several years ago – patient was not admitted, per Mr. Grayson.   VIOLENCE:  None  SUBSTANCE USE: Mr. Grayson stated patient drinks alcohol socially, no other substances used.       ==============  OTHER HISTORY  ==============  CURRENT MEDICATION:  Mr. Grayson reported patient takes Pristiq 50MG QD and Clonazepam 0.5MG PRN.  MEDICAL HISTORY:  None reported.   ALLERGIES: None  LEGAL ISSUES: None  FIREARM ACCESS: None  SOCIAL HISTORY: Mr. Grayson stated patient mainly socializes with family and friends.   FAMILY HISTORY: Mr. Grayson reported patient’s brother has a Dx/o Bipolar disorder and Asbergers Syndrome.   DEVELOPMENTAL HISTORY: None.     COVID Exposure Screen- collateral (i.e. third-party, chart review, belongings, etc; include EMS and ED staff)  1.	*Has the patient had a COVID-19 test in the last 90 days?  (  ) Yes   (x  ) No   (  ) Unknown- Reason: _____  IF YES PROCEED TO QUESTION #2. IF NO OR UNKNOWN, PLEASE SKIP TO QUESTION #3.  2.	Date of test(s) and result(s): ________  3.	*Has the patient tested positive for COVID-19 antibodies? (  ) Yes   ( x ) No   (  ) Unknown- Reason: _____  IF YES PROCEED TO QUESTION #4. IF NO or UNKNOWN, PLEASE SKIP TO QUESTION #5.  4.	Date of positive antibody test: ________  5.	*Has the patient received 2 doses of the COVID-19 vaccine? ( x ) Yes   (  ) No   (  ) Unknown- Reason: _____  IF YES PROCEED TO QUESTION #6. IF NO or UNKNOWN, PLEASE SKIP TO QUESTION #7.  6.	 Date of second dose: March 2021  7.	*In the past 10 days, has the patient been around anyone with a positive COVID-19 test?* (  ) Yes   (x  ) No   (  ) Unknown- Reason: __  IF YES PROCEED TO QUESTION #8. IF NO or UNKNOWN, PLEASE SKIP TO QUESTION #13.  8.	Was the patient within 6 feet of them for at least 15 minutes? (  ) Yes   (  ) No   (  ) Unknown- Reason: _____  9.	Did the patient provide care for them? (  ) Yes   (  ) No   (  ) Unknown- Reason: ______  10.	Did the patient have direct physical contact with them (touched, hugged, or kissed them)? (  ) Yes   (  ) No    (  ) Unknown- Reason: __  11.	Did the patient share eating or drinking utensils with them? (  ) Yes   (  ) No    (  ) Unknown- Reason: ____  12.	Did they sneeze, cough, or somehow get respiratory droplets on the patient? (  ) Yes   (  ) No    (  ) Unknown- Reason: ______  13.	*Has the patient been out of [place] within the past 10 days?* ( x ) Yes   (  ) No   (  ) Unknown- Reason: _____  IF YES PLEASE ANSWER THE FOLLOWING QUESTIONS:  14.	Which state/country did they go to? [place]  15.	Were they there over 24 hours? (x  ) Yes   (  ) No    (  ) Unknown- Reason: ______  16.	Date of return to [facility]: 6/15/21    [facility] spoke with father Mr. Grayson to inform of disposition to discharge patient with recommendation for follow-up with psychologist. Mr. Grayson stated that they will collaborate with patient to see therapist sooner than next week. [facility] also instructed Mr. Grayson to ensure patient's medication usage is monitored to ensure patient is taking her psychiatric meds as prescribed, and to lock up any sharp objects, medications, and other potentially dangerous items that may pose a risk to patient's safety. Mr. Grayson expressed agreement with the plan, and denied having safety concerns of taking the patient home.

## 2021-06-16 NOTE — ED BEHAVIORAL HEALTH ASSESSMENT NOTE - NSSUICPROTFACT_PSY_ALL_CORE
Responsibility to children, family, or others/Identifies reasons for living/Supportive social network of family or friends/Cultural, spiritual and/or moral attitudes against suicide/Engaged in work or school/Positive therapeutic relationships

## 2021-06-16 NOTE — ED BEHAVIORAL HEALTH ASSESSMENT NOTE - HPI (INCLUDE ILLNESS QUALITY, SEVERITY, DURATION, TIMING, CONTEXT, MODIFYING FACTORS, ASSOCIATED SIGNS AND SYMPTOMS)
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 Yes, about 1 month ago; result: negative  Have you tested positive for COVID-19 antibodies? No.  Have you received 2 doses of the COVID-19 vaccine? Yes, received Pfizer 2 doses; reports received last dose >2 weeks ago but cannot remember exct day  In the past 10 days, have you been around anyone with a positive COVID-19 test? No.  Have you been out of [place] within the past 10 days? Yes, traveled to [place], [place] on 6/15/2021. 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 Patient reports before this morning she was "in an okay place", denying recent neurovegetative symptoms of depression (denying changes in sleep, energy level, concentration, low self esteem or guilty feelings, or anhedonia) and denying any recent suicidal ideation. Says that after her boyfriend broke up with her, she became distressed, called her therapist and endorsed passive SI with vague thoughts of taking pills to overdose. Therapist recommended to patient's father that patient present to ED for psychiatric evaluation. She denies any suicidal intent today. Patient reports feeling "upset" and "a little better now", while still acknowledging distress and sadness about the breakup. She is denying any current suicidal ideation, intent or plan. Says "my family" and thinking about the effect her suicide might have on them as a major factor preventing her from acting on any thoughts of not wanting to live. Also discusses plans to graduate from [employer] school and continue a career in beauty. Patient denies hopelessness. She is able to list various coping strategies (taking a hot shower, going on a walk, talking to someone) that she identifies as being useful to her when dealing with distress. Patient denies any homicidal/aggressive ideation. Denies any symptoms concerning for tray or hypomania. Denies AH/VH/paranoid ideation. No delusions elicited on exam. Denies any history of trauma (denies physical or sexual abuse history) or any symptoms concerning for PTSD, OCD, or panic disorder.    Collateral data obtained from patient's father with whom she lives. Please see [facility] note for full details. Father did not express any acute safety concerns for patient at this time.    COVID Exposure Screen- Patient  Have you had a COVID-19 test in the last 90 days? Yes, about 1 month ago; result: negative  Have you tested positive for COVID-19 antibodies? No.  Have you received 2 doses of the COVID-19 vaccine? Yes, received Pfizer 2 doses; reports received last dose >2 weeks ago but cannot remember exact day  In the past 10 days, have you been around anyone with a positive COVID-19 test? No.  Have you been out of [place] within the past 10 days? Yes, traveled to [place], [place] on 6/15/2021. 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 Patient reports before this morning she was "in an okay place", denying recent neurovegetative symptoms of depression (denying changes in sleep, energy level, concentration, low self esteem or guilty feelings, or anhedonia) and denying any recent suicidal ideation. Says that after her boyfriend broke up with her, she became distressed, called her therapist and endorsed passive SI with vague thoughts of taking pills to overdose. Therapist recommended to patient's father that patient present to ED for psychiatric evaluation. She denies any suicidal intent today. Patient reports feeling "upset" and "a little better now", while still acknowledging distress and sadness about the breakup. She is denying any current suicidal ideation, intent or plan. Says "my family" and thinking about the effect her suicide might have on them as a major factor preventing her from acting on any thoughts of not wanting to live. Also discusses plans to graduate from [employer] school and continue a career in beauty. Patient denies hopelessness. She is able to list various coping strategies (taking a hot shower, going on a walk, talking to someone) that she identifies as being useful to her when dealing with distress. Patient denies any homicidal/aggressive ideation. Denies any symptoms concerning for tray or hypomania. Denies AH/VH/paranoid ideation. No delusions elicited on exam. Denies any history of trauma (denies physical or sexual abuse history) or any symptoms concerning for PTSD, OCD, or panic disorder. Denies any nicotine use, denies marijuana use or any other illicit substance use. Reports infrequent alcohol use ("about 1-2 drinks per week, if that").    Collateral data obtained from patient's father with whom she lives. Please see [facility] note for full details. Father did not express any acute safety concerns for patient at this time.    COVID Exposure Screen- Patient  Have you had a COVID-19 test in the last 90 days? Yes, about 1 month ago; result: negative  Have you tested positive for COVID-19 antibodies? No.  Have you received 2 doses of the COVID-19 vaccine? Yes, received Pfizer 2 doses; reports received last dose >2 weeks ago but cannot remember exact day  In the past 10 days, have you been around anyone with a positive COVID-19 test? No.  Have you been out of [place] within the past 10 days? Yes, traveled to [place], [place] on 6[place]15/2021.

## 2021-06-16 NOTE — ED BEHAVIORAL HEALTH ASSESSMENT NOTE - DETAILS
endorses passive SI with no intent and vague thoughts of taking pills that started this morning after learning that her boyfriend was breaking up with her brother - bipolar d/o; father - anxiety- mother - OCD and depression; cousin with anxiety n/a Safety Plan note for details; reviewed safety plan with patient and discussed with father

## 2021-06-16 NOTE — ED BEHAVIORAL HEALTH ASSESSMENT NOTE - SUMMARY
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 Patient is not at imminent risk of harm to herself as she is now denying suicidal ideation, intent or plan. She is future-oriented and engaged in active safety planning in the ED. Voluntary psychiatric hospitalization was offered to patient and she declined, verbalizing that she preferred to continue treatment with her current outpatient providers and would seek to increase frequency of therapy sessions from once every other week to once per week. Patient does not meet criteria for involuntary psychiatric hospitalization.

## 2021-06-16 NOTE — ED PROVIDER NOTE - PROGRESS NOTE DETAILS
Pt seen by telepsych, as per psychiatrist, pt cleared for d/c home and arranged to see therapist once weekly instead of every other week and also f/u psychiatrist. Father in agreement with plan.

## 2021-06-16 NOTE — ED BEHAVIORAL HEALTH ASSESSMENT NOTE - MEDICATIONS (PRESCRIPTIONS, DIRECTIONS)
Continue outpatient psychiatric regimen of: Pristiq 50 mg daily and clonazepam 0.5 mg twice daily as needed for severe anxiety; Patient offered one time dose of clonazepam 0.5 mg in the ED, which she had not yet taken today; Plan after discharge: Continue outpatient psychiatric regimen of: Pristiq 50 mg daily and clonazepam 0.5 mg twice daily as needed for severe anxiety;

## 2021-06-16 NOTE — ED BEHAVIORAL HEALTH ASSESSMENT NOTE - PAST PSYCHOTROPIC MEDICATION
Reports medication trials with Prozac and "other antidepressants" but cannot remember names or dosages or provide other details

## 2021-06-16 NOTE — ED ADULT TRIAGE NOTE - CHIEF COMPLAINT QUOTE
Patient states that she has borderline personality disorder, she had a painful break-up with her sig. other this morning, and she is tearful, depressed and having thoughts of suicide

## 2021-06-16 NOTE — ED BEHAVIORAL HEALTH ASSESSMENT NOTE - OTHER PAST PSYCHIATRIC HISTORY (INCLUDE DETAILS REGARDING ONSET, COURSE OF ILLNESS, INPATIENT/OUTPATIENT TREATMENT)
Denies history of psychiatric hospitalizations; reports 2 lifetime visits to ED for psychiatric reasons when she was younger (under age 15); denies any h/o SA; reports history of NSSIB (cutting) but no recent cutting in last 2 months

## 2021-06-16 NOTE — ED PROVIDER NOTE - ATTENDING CONTRIBUTION TO CARE
20 yo F with hx borderline personality disorder p/w sp recent breakup, now with increased depression - has been thinking of hurting herself. Pt had thoughts of taking a bunch of pills. Pt did not actually ingest anything, no attempt made. Pt wiht hx depression / anxiety, known hx borderline personality. no somatic complaints. no known covid exposures. no abd pain. no n/v/d. No cp/sob/palp. no weakness / dizziness. no other inj or co.  exam; MM Moist. non-toxic, well appearing. Nl resp effort. no acc muscle use. no c/c/e. Nl gait. non-focal neuro exam. Pos SI, slight depressed affect  check labs, UA / tox, Telepsy eval

## 2021-06-16 NOTE — ED BEHAVIORAL HEALTH ASSESSMENT NOTE - NS ED [facility]HA TELEPSYCH PROVIDER LOCATION
Left message this AM, that Dr. Diamond is in surgery, but, she can bring baby later this afternoon. Had spoken with her last Thursday about coming in Friday, but, did not hear back.   [facility]

## 2021-06-16 NOTE — ED BEHAVIORAL HEALTH ASSESSMENT NOTE - SAFETY PLAN ADDT'L DETAILS
Safety plan discussed with.../Education provided regarding environmental safety / lethal means restriction/Provision of National Suicide Prevention Lifeline 2-115-799-HDFS (9273)

## 2021-06-16 NOTE — ED BEHAVIORAL HEALTH ASSESSMENT NOTE - CURRENT MEDICATION
Pristiq 50 mg daily for depression; Klonopin 0.5 mg twice daily as needed for severe anxiety; amlodipine dose unknown for HTN; spirolactone dose unknown for HTN; metformin 1000 mg daily for DM

## 2021-06-16 NOTE — ED BEHAVIORAL HEALTH ASSESSMENT NOTE - DESCRIPTION
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

## 2021-06-16 NOTE — ED BEHAVIORAL HEALTH ASSESSMENT NOTE - CURRENT INTENT:
Patient: Mario Posada    * No procedures listed *    Diagnosis:* No pre-op diagnosis entered *  Diagnosis Additional Information: No value filed.    Anesthesia Type:  MAC    Note:  Anesthesia Post Evaluation    Patient location during evaluation: ED and Bedside  Patient participation: Able to fully participate in evaluation  Level of consciousness: awake and alert  Pain management: adequate  Airway patency: patent  Cardiovascular status: acceptable  Respiratory status: acceptable  Hydration status: acceptable  PONV: none     Anesthetic complications: None          Last vitals:  Vitals:    03/01/19 1545 03/01/19 1550 03/01/19 1555   BP: 94/47 101/52 98/59   Pulse: 108 121 106   Resp: 17 17 15   Temp:      SpO2: 99% 99% 99%         Electronically Signed By: Cristobal Villa CRNA, APRN CRNA  March 1, 2019  4:09 PM   None known

## 2021-06-16 NOTE — ED PROVIDER NOTE - CLINICAL SUMMARY MEDICAL DECISION MAKING FREE TEXT BOX
22 y/o F with hx of BPD, DM, HTN, anxiety depression co worsening depression and suicidal ideations today s/p boyfriend broke up with her today, dranks some beer this am, also thought of taking a bottle of pills but denies any attempts, admitted this to her therapist and advised to come to ED for eval, denies HI/drug abuse/hallucinations, has not taken her BP meds in a week, denies CP/SOB/headache/vision changes; will get ekg, 1:1, labs, etoh, utox, pregnancy test, BP meds, telepsych consult

## 2021-06-16 NOTE — ED BEHAVIORAL HEALTH ASSESSMENT NOTE - OTHER
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

## 2021-07-27 NOTE — PROVIDER CONTACT NOTE (CHANGE IN STATUS NOTIFICATION) - DATE AND TIME:
History and Physical / Pre-Sedation Assessment      PMHx:   Past Medical History:   Diagnosis Date    Hyperlipidemia     Hypertension     Obstructive sleep apnea     non compliant with cpap       Medications:   Prior to Admission medications    Medication Sig Start Date End Date Taking? Authorizing Provider   aspirin 81 MG chewable tablet Take by mouth   Yes Historical Provider, MD   benazepril (LOTENSIN) 20 MG tablet Take 40 mg by mouth daily 12/14/20 12/14/21 Yes Historical Provider, MD   metoprolol succinate (TOPROL XL) 25 MG extended release tablet Take 25 mg by mouth daily 2/24/21  Yes Historical Provider, MD   Multiple Vitamin (MULTIVITAMIN) tablet Take 1 tablet by mouth daily   Yes Historical Provider, MD   omeprazole (PRILOSEC) 20 MG delayed release capsule Take 40 mg by mouth 2 times daily as needed   Yes Historical Provider, MD   atorvastatin (LIPITOR) 20 MG tablet Take 20 mg by mouth daily   Yes Historical Provider, MD   sertraline (ZOLOFT) 50 MG tablet Take 50 mg by mouth daily   Yes Historical Provider, MD   busPIRone (BUSPAR) 10 MG tablet Take by mouth    Historical Provider, MD   nitroGLYCERIN (NITROSTAT) 0.4 MG SL tablet Place 0.4 mg under the tongue every 5 minutes as needed 2/9/21   Historical Provider, MD       Allergies:    Allergies   Allergen Reactions    Lisinopril Other (See Comments)     Depletes potassium       PSHx:   Past Surgical History:   Procedure Laterality Date    CARDIAC SURGERY      cardiac cath only     COLONOSCOPY      ENDOSCOPY, COLON, DIAGNOSTIC      EYE SURGERY      HERNIA REPAIR      VASECTOMY         Social Hx:   Social History     Socioeconomic History    Marital status:      Spouse name: Not on file    Number of children: Not on file    Years of education: Not on file    Highest education level: Not on file   Occupational History    Not on file   Tobacco Use    Smoking status: Never Smoker    Smokeless tobacco: Never Used   Vaping Use    Vaping 18-Mar-2017 08:00 Use: Never used   Substance and Sexual Activity    Alcohol use: Yes     Alcohol/week: 3.0 standard drinks     Types: 2 Cans of beer, 1 Shots of liquor per week    Drug use: Not Currently    Sexual activity: Not on file   Other Topics Concern    Not on file   Social History Narrative    Not on file     Social Determinants of Health     Financial Resource Strain:     Difficulty of Paying Living Expenses:    Food Insecurity:     Worried About Running Out of Food in the Last Year:     [address] in the Last Year:    Transportation Needs:     Lack of Transportation (Medical):  Lack of Transportation (Non-Medical):    Physical Activity:     Days of Exercise per Week:     Minutes of Exercise per Session:    Stress:     Feeling of Stress :    Social Connections:     Frequency of Communication with Friends and Family:     Frequency of Social Gatherings with Friends and Family:     Attends [employer] Services:     Active Member of Clubs or Organizations:     Attends Club or Organization Meetings:     Marital Status:    Intimate Partner Violence:     Fear of Current or Ex-Partner:     Emotionally Abused:     Physically Abused:     Sexually Abused:        Family Hx:   Family History   Problem Relation Age of Onset    Cancer Father 70        prostate       Physical Exam:  Vital Signs: /85   Pulse 75   Temp 98 °F (36.7 °C) (Tympanic)   Resp 16   Ht 5' 10\" (1.778 m)   Wt 230 lb (104.3 kg)   SpO2 99%   BMI 33.00 kg/m²    Airway: Mallampati: II (soft palate, uvula, fauces visible)  Pulmonary:Normal  Cardiac:Normal  Abdomen:Normal    Pre-Procedure Assessment / Plan:  ASA: Class 2 - A normal healthy patient with mild systemic disease  Level of Sedation Plan:Deep sedation  Post Procedure plan: Return to same level of care    I assessed the patient and find that the patient is in satisfactory condition to proceed with the planned procedure and sedation plan.     I have explained the risk, benefits, and alternatives to the procedure; the patient understands and agrees to proceed.        Corazon Mcintyre MD  7/27/2021

## 2021-08-11 PROBLEM — F60.3 BORDERLINE PERSONALITY DISORDER: Chronic | Status: ACTIVE | Noted: 2021-06-16

## 2021-08-16 ENCOUNTER — APPOINTMENT (OUTPATIENT)
Dept: INTERNAL MEDICINE | Facility: CLINIC | Age: 21
End: 2021-08-16
Payer: COMMERCIAL

## 2021-08-16 VITALS
TEMPERATURE: 97.5 F | DIASTOLIC BLOOD PRESSURE: 110 MMHG | HEART RATE: 92 BPM | OXYGEN SATURATION: 98 % | SYSTOLIC BLOOD PRESSURE: 180 MMHG | RESPIRATION RATE: 16 BRPM

## 2021-08-16 DIAGNOSIS — L68.0 HIRSUTISM: ICD-10-CM

## 2021-08-16 PROCEDURE — 99214 OFFICE O/P EST MOD 30 MIN: CPT

## 2021-08-16 RX ORDER — AMOXICILLIN 875 MG/1
875 TABLET, FILM COATED ORAL
Qty: 14 | Refills: 0 | Status: DISCONTINUED | COMMUNITY
Start: 2020-12-16 | End: 2021-08-16

## 2021-08-16 RX ORDER — METFORMIN HYDROCHLORIDE 1000 MG/1
1000 TABLET, COATED ORAL TWICE DAILY
Qty: 180 | Refills: 1 | Status: DISCONTINUED | COMMUNITY
End: 2021-08-16

## 2021-08-16 RX ORDER — BUPROPION HYDROCHLORIDE 150 MG/1
150 TABLET, EXTENDED RELEASE ORAL
Qty: 30 | Refills: 0 | Status: DISCONTINUED | COMMUNITY
Start: 2020-01-04 | End: 2021-08-16

## 2021-08-16 RX ORDER — AZELASTINE HYDROCHLORIDE 137 UG/1
0.1 SPRAY, METERED NASAL
Qty: 30 | Refills: 0 | Status: DISCONTINUED | COMMUNITY
Start: 2021-02-22

## 2021-08-16 RX ORDER — VALACYCLOVIR 500 MG/1
500 TABLET, FILM COATED ORAL
Qty: 28 | Refills: 0 | Status: DISCONTINUED | COMMUNITY
Start: 2020-12-16 | End: 2021-08-16

## 2021-08-16 RX ORDER — SITAGLIPTIN 100 MG/1
100 TABLET, FILM COATED ORAL
Qty: 90 | Refills: 0 | Status: DISCONTINUED | COMMUNITY
Start: 2019-10-16 | End: 2021-08-16

## 2021-08-16 NOTE — PHYSICAL EXAM
[No Acute Distress] : no acute distress [Well Nourished] : well nourished [Well Developed] : well developed [Well-Appearing] : well-appearing [Normal Sclera/Conjunctiva] : normal sclera/conjunctiva [PERRL] : pupils equal round and reactive to light [EOMI] : extraocular movements intact [Normal Outer Ear/Nose] : the outer ears and nose were normal in appearance [Normal Oropharynx] : the oropharynx was normal [No JVD] : no jugular venous distention [Supple] : supple [No Lymphadenopathy] : no lymphadenopathy [Thyroid Normal, No Nodules] : the thyroid was normal and there were no nodules present [No Accessory Muscle Use] : no accessory muscle use [No Respiratory Distress] : no respiratory distress  [Clear to Auscultation] : lungs were clear to auscultation bilaterally [Normal Rate] : normal rate  [Regular Rhythm] : with a regular rhythm [Normal S1, S2] : normal S1 and S2 [No Murmur] : no murmur heard [No Abdominal Bruit] : a ~M bruit was not heard ~T in the abdomen [No Carotid Bruits] : no carotid bruits [No Varicosities] : no varicosities [Pedal Pulses Present] : the pedal pulses are present [No Edema] : there was no peripheral edema [No Palpable Aorta] : no palpable aorta [Soft] : abdomen soft [No Extremity Clubbing/Cyanosis] : no extremity clubbing/cyanosis [Non Tender] : non-tender [Non-distended] : non-distended [No Masses] : no abdominal mass palpated [No HSM] : no HSM [Normal Bowel Sounds] : normal bowel sounds [Normal Posterior Cervical Nodes] : no posterior cervical lymphadenopathy [Normal Anterior Cervical Nodes] : no anterior cervical lymphadenopathy [No CVA Tenderness] : no CVA  tenderness [No Spinal Tenderness] : no spinal tenderness [Grossly Normal Strength/Tone] : grossly normal strength/tone [No Joint Swelling] : no joint swelling [No Rash] : no rash [Coordination Grossly Intact] : coordination grossly intact [No Focal Deficits] : no focal deficits [Normal Gait] : normal gait [Deep Tendon Reflexes (DTR)] : deep tendon reflexes were 2+ and symmetric [Normal Affect] : the affect was normal [Normal Insight/Judgement] : insight and judgment were intact

## 2021-08-16 NOTE — HISTORY OF PRESENT ILLNESS
[FreeTextEntry8] : Pt is overall feeling well.\par She sees a psychiatrist every 3 months. Pt also sees a therapist weekly.\par She needs a form filled out [facility] Division of Licensing. She is a cosmtologist.

## 2021-12-09 ENCOUNTER — RX RENEWAL (OUTPATIENT)
Age: 21
End: 2021-12-09

## 2021-12-13 ENCOUNTER — APPOINTMENT (OUTPATIENT)
Dept: INTERNAL MEDICINE | Facility: CLINIC | Age: 21
End: 2021-12-13
Payer: COMMERCIAL

## 2021-12-13 VITALS
HEIGHT: 64 IN | TEMPERATURE: 97.6 F | WEIGHT: 293 LBS | BODY MASS INDEX: 50.02 KG/M2 | HEART RATE: 106 BPM | SYSTOLIC BLOOD PRESSURE: 140 MMHG | RESPIRATION RATE: 14 BRPM | OXYGEN SATURATION: 98 % | DIASTOLIC BLOOD PRESSURE: 80 MMHG

## 2021-12-13 DIAGNOSIS — H66.92 OTITIS MEDIA, UNSPECIFIED, LEFT EAR: ICD-10-CM

## 2021-12-13 PROCEDURE — 99214 OFFICE O/P EST MOD 30 MIN: CPT

## 2021-12-13 NOTE — HISTORY OF PRESENT ILLNESS
[FreeTextEntry8] : Pt is c/o left ear pain for 2-3 days.\par Pt had COVID 19 infection over 2 weeks ago. Completed quarantine. Had a  subsequent negative COVID test.

## 2021-12-13 NOTE — PHYSICAL EXAM
[No Acute Distress] : no acute distress [Well Nourished] : well nourished [Well Developed] : well developed [Well-Appearing] : well-appearing [Normal Sclera/Conjunctiva] : normal sclera/conjunctiva [PERRL] : pupils equal round and reactive to light [EOMI] : extraocular movements intact [Normal Outer Ear/Nose] : the outer ears and nose were normal in appearance [Normal Oropharynx] : the oropharynx was normal [No JVD] : no jugular venous distention [No Lymphadenopathy] : no lymphadenopathy [Supple] : supple [Thyroid Normal, No Nodules] : the thyroid was normal and there were no nodules present [No Respiratory Distress] : no respiratory distress  [No Accessory Muscle Use] : no accessory muscle use [Clear to Auscultation] : lungs were clear to auscultation bilaterally [Normal Rate] : normal rate  [Regular Rhythm] : with a regular rhythm [Normal S1, S2] : normal S1 and S2 [No Murmur] : no murmur heard [No Carotid Bruits] : no carotid bruits [No Abdominal Bruit] : a ~M bruit was not heard ~T in the abdomen [No Varicosities] : no varicosities [Pedal Pulses Present] : the pedal pulses are present [No Edema] : there was no peripheral edema [No Palpable Aorta] : no palpable aorta [No Extremity Clubbing/Cyanosis] : no extremity clubbing/cyanosis [Soft] : abdomen soft [Non Tender] : non-tender [Non-distended] : non-distended [No Masses] : no abdominal mass palpated [No HSM] : no HSM [Normal Bowel Sounds] : normal bowel sounds [Normal Posterior Cervical Nodes] : no posterior cervical lymphadenopathy [Normal Anterior Cervical Nodes] : no anterior cervical lymphadenopathy [No CVA Tenderness] : no CVA  tenderness [No Spinal Tenderness] : no spinal tenderness [No Joint Swelling] : no joint swelling [Grossly Normal Strength/Tone] : grossly normal strength/tone [No Rash] : no rash [Coordination Grossly Intact] : coordination grossly intact [No Focal Deficits] : no focal deficits [Normal Gait] : normal gait [Deep Tendon Reflexes (DTR)] : deep tendon reflexes were 2+ and symmetric [Normal Affect] : the affect was normal [Normal Insight/Judgement] : insight and judgment were intact [de-identified] : Mild erythema Left TM

## 2022-01-11 ENCOUNTER — RX RENEWAL (OUTPATIENT)
Age: 22
End: 2022-01-11

## 2022-07-01 ENCOUNTER — EMERGENCY (EMERGENCY)
Facility: HOSPITAL | Age: 22
LOS: 1 days | Discharge: HOME | End: 2022-07-01
Attending: EMERGENCY MEDICINE | Admitting: EMERGENCY MEDICINE
Payer: COMMERCIAL

## 2022-07-01 VITALS
DIASTOLIC BLOOD PRESSURE: 116 MMHG | RESPIRATION RATE: 18 BRPM | HEIGHT: 66 IN | WEIGHT: 293 LBS | TEMPERATURE: 98 F | HEART RATE: 120 BPM | OXYGEN SATURATION: 98 % | SYSTOLIC BLOOD PRESSURE: 177 MMHG

## 2022-07-01 DIAGNOSIS — Z98.890 OTHER SPECIFIED POSTPROCEDURAL STATES: Chronic | ICD-10-CM

## 2022-07-01 DIAGNOSIS — Z90.89 ACQUIRED ABSENCE OF OTHER ORGANS: Chronic | ICD-10-CM

## 2022-07-01 DIAGNOSIS — Z90.49 ACQUIRED ABSENCE OF OTHER SPECIFIED PARTS OF DIGESTIVE TRACT: Chronic | ICD-10-CM

## 2022-07-01 LAB
ANION GAP SERPL CALC-SCNC: 11 MMOL/L — SIGNIFICANT CHANGE UP (ref 5–17)
APAP SERPL-MCNC: <2 UG/ML — LOW (ref 10–30)
BASOPHILS # BLD AUTO: 0.07 K/UL — SIGNIFICANT CHANGE UP (ref 0–0.2)
BASOPHILS NFR BLD AUTO: 0.4 % — SIGNIFICANT CHANGE UP (ref 0–2)
BUN SERPL-MCNC: 12 MG/DL — SIGNIFICANT CHANGE UP (ref 7–23)
CALCIUM SERPL-MCNC: 9.8 MG/DL — SIGNIFICANT CHANGE UP (ref 8.5–10.1)
CHLORIDE SERPL-SCNC: 100 MMOL/L — SIGNIFICANT CHANGE UP (ref 96–108)
CO2 SERPL-SCNC: 28 MMOL/L — SIGNIFICANT CHANGE UP (ref 22–31)
CREAT SERPL-MCNC: 0.99 MG/DL — SIGNIFICANT CHANGE UP (ref 0.5–1.3)
EGFR: 82 ML/MIN/1.73M2 — SIGNIFICANT CHANGE UP
EOSINOPHIL # BLD AUTO: 0.22 K/UL — SIGNIFICANT CHANGE UP (ref 0–0.5)
EOSINOPHIL NFR BLD AUTO: 1.2 % — SIGNIFICANT CHANGE UP (ref 0–6)
GLUCOSE SERPL-MCNC: 223 MG/DL — HIGH (ref 70–99)
HCG SERPL-ACNC: <1 MIU/ML — SIGNIFICANT CHANGE UP
HCT VFR BLD CALC: 41 % — SIGNIFICANT CHANGE UP (ref 34.5–45)
HGB BLD-MCNC: 13.8 G/DL — SIGNIFICANT CHANGE UP (ref 11.5–15.5)
IMM GRANULOCYTES NFR BLD AUTO: 0.3 % — SIGNIFICANT CHANGE UP (ref 0–1.5)
LYMPHOCYTES # BLD AUTO: 35.3 % — SIGNIFICANT CHANGE UP (ref 13–44)
LYMPHOCYTES # BLD AUTO: 6.42 K/UL — HIGH (ref 1–3.3)
MCHC RBC-ENTMCNC: 29.1 PG — SIGNIFICANT CHANGE UP (ref 27–34)
MCHC RBC-ENTMCNC: 33.7 GM/DL — SIGNIFICANT CHANGE UP (ref 32–36)
MCV RBC AUTO: 86.5 FL — SIGNIFICANT CHANGE UP (ref 80–100)
MONOCYTES # BLD AUTO: 1.39 K/UL — HIGH (ref 0–0.9)
MONOCYTES NFR BLD AUTO: 7.6 % — SIGNIFICANT CHANGE UP (ref 2–14)
NEUTROPHILS # BLD AUTO: 10.04 K/UL — HIGH (ref 1.8–7.4)
NEUTROPHILS NFR BLD AUTO: 55.2 % — SIGNIFICANT CHANGE UP (ref 43–77)
NRBC # BLD: 0 /100 WBCS — SIGNIFICANT CHANGE UP (ref 0–0)
PLATELET # BLD AUTO: 355 K/UL — SIGNIFICANT CHANGE UP (ref 150–400)
POTASSIUM SERPL-MCNC: 3.3 MMOL/L — LOW (ref 3.5–5.3)
POTASSIUM SERPL-SCNC: 3.3 MMOL/L — LOW (ref 3.5–5.3)
RBC # BLD: 4.74 M/UL — SIGNIFICANT CHANGE UP (ref 3.8–5.2)
RBC # FLD: 12.7 % — SIGNIFICANT CHANGE UP (ref 10.3–14.5)
SALICYLATES SERPL-MCNC: <1.7 MG/DL — LOW (ref 2.8–20)
SARS-COV-2 RNA SPEC QL NAA+PROBE: SIGNIFICANT CHANGE UP
SODIUM SERPL-SCNC: 139 MMOL/L — SIGNIFICANT CHANGE UP (ref 135–145)
WBC # BLD: 18.2 K/UL — HIGH (ref 3.8–10.5)
WBC # FLD AUTO: 18.2 K/UL — HIGH (ref 3.8–10.5)

## 2022-07-01 PROCEDURE — 71045 X-RAY EXAM CHEST 1 VIEW: CPT | Mod: 26

## 2022-07-01 PROCEDURE — 31500 INSERT EMERGENCY AIRWAY: CPT

## 2022-07-01 PROCEDURE — 93010 ELECTROCARDIOGRAM REPORT: CPT

## 2022-07-01 PROCEDURE — 99291 CRITICAL CARE FIRST HOUR: CPT | Mod: 25

## 2022-07-01 RX ORDER — DESVENLAFAXINE 50 MG/1
0 TABLET, EXTENDED RELEASE ORAL
Qty: 0 | Refills: 0 | DISCHARGE

## 2022-07-01 RX ORDER — ACTIVATED CHARCOAL 25 G/120ML
50 SUSPENSION, ORAL (FINAL DOSE FORM) ORAL ONCE
Refills: 0 | Status: COMPLETED | OUTPATIENT
Start: 2022-07-01 | End: 2022-07-01

## 2022-07-01 RX ORDER — ONDANSETRON 8 MG/1
4 TABLET, FILM COATED ORAL ONCE
Refills: 0 | Status: COMPLETED | OUTPATIENT
Start: 2022-07-01 | End: 2022-07-01

## 2022-07-01 RX ORDER — SODIUM CHLORIDE 9 MG/ML
1000 INJECTION INTRAMUSCULAR; INTRAVENOUS; SUBCUTANEOUS ONCE
Refills: 0 | Status: COMPLETED | OUTPATIENT
Start: 2022-07-01 | End: 2022-07-01

## 2022-07-01 RX ORDER — ATOMOXETINE HYDROCHLORIDE 10 MG/1
1 CAPSULE ORAL
Qty: 0 | Refills: 0 | DISCHARGE

## 2022-07-01 RX ADMIN — SODIUM CHLORIDE 1000 MILLILITER(S): 9 INJECTION INTRAMUSCULAR; INTRAVENOUS; SUBCUTANEOUS at 21:54

## 2022-07-01 RX ADMIN — ONDANSETRON 4 MILLIGRAM(S): 8 TABLET, FILM COATED ORAL at 21:54

## 2022-07-01 RX ADMIN — Medication 50 GRAM(S): at 21:55

## 2022-07-01 NOTE — ED PROVIDER NOTE - CRITICAL CARE ATTENDING CONTRIBUTION TO CARE
Upon my evaluation, this patient has a high probability of imminent or life-threatening deterioration which required my direct attention, intervention and personal management.  I have personally provided the amount of critical care time documented above excluding time spent on separate procedures.

## 2022-07-01 NOTE — ED PROVIDER NOTE - PROGRESS NOTE DETAILS
Josh tele psych Josh tele psych consulted, discussed case Patients father at the bed side , discussed the case NGT stomach was lavaged and activated charcoal was given Dr Santana telepsych is  admitting  the patient,   they are arranging a bed that will provide CPAP for this patient with h/o  SATISH, the bed will be available in the morning Telepsych has cleared patient to go home and will follow-up with her own Psych as arranged.

## 2022-07-01 NOTE — ED PROVIDER NOTE - OBJECTIVE STATEMENT
Pt c/o feeling depressed, tearful, admits to taking 6 or 7 pills of Amlodipine 10mg around 8:30, admits to suicidal ideation.  suicidal attempt, ingestion  Possible overdose 21 y/o female H/O Borderline personality disorder Rx Pristiq, Clonopin,  amlodipine 10mg,  metformin H/O  DM type-2, HTN,  PCOD Pt c/o feeling depressed, tearful, difficulty at work. she wanted it to end,  admits to taking 6 or 7 pills of Amlodipine 10mg around 8:30, h/o  suicidal ideation, today is first time she acted out a plan.   She seen a nurse practicer [place], psychiatry 23 y/o female H/O Borderline personality disorder Rx Pristiq, Clonopin,  amlodipine 10mg,  metformin H/O  DM type-2, HTN,  PCOD, SATISH Pt c/o feeling depressed, tearful, difficulty at work. she wanted it to end,  admits to taking 6 or 7 pills of Amlodipine 10mg around 8:30, h/o  suicidal ideation, today is first time she acted out a plan.   She seen a nurse practicer [place], psychiatry

## 2022-07-01 NOTE — ED PROVIDER NOTE - CLINICAL SUMMARY MEDICAL DECISION MAKING FREE TEXT BOX
[place] cathter for urinary retention,  sent from the [facility]   cathter was inserted by RN, 500 cc of urine drained , normal in appearance, clear yellow and no blood acute drug overdose, h/o depression,  suicidal intent, stomach cleared treated with activated charcoal, cardiac monitor, psych consult determined psych admission needed.

## 2022-07-01 NOTE — ED PROVIDER NOTE - CARE PLAN
Principal Discharge DX:	Urinary retention   1 Principal Discharge DX:	Drug overdose  Secondary Diagnosis:	Major depression

## 2022-07-01 NOTE — ED ADULT TRIAGE NOTE - CHIEF COMPLAINT QUOTE
Pt c/o feeling depressed, tearful, admits to taking 6 or 7 pills of Amlodipine 10mg around 8:30, admits to suicidal ideation.

## 2022-07-01 NOTE — ED PROVIDER NOTE - NSICDXPASTSURGICALHX_GEN_ALL_CORE_FT
PAST SURGICAL HISTORY:  H/O adenoidectomy     H/O myringotomy with tubes    S/P cholecystectomy

## 2022-07-01 NOTE — ED ADULT NURSE NOTE - OBJECTIVE STATEMENT
pt reports she attempted sucicide by taking 10 pills of norvasc around 8 pm. Pt reports this is her first attempt. pt reports she attempted sucicide by taking 10 pills of norvasc around 8 pm. Pt reports this is her first attempt, as per father at bedside pt has mood disorder

## 2022-07-01 NOTE — ED ADULT TRIAGE NOTE - IDEAL BODY WEIGHT(KG)
Received refill request for escitalopram from [facility]; Patient was last seen in July and has follow up appointment in January with Dr. Guerra. Refilled per [facility] refill protocol.    Araseli Huerta RN      
58

## 2022-07-01 NOTE — ED PROVIDER NOTE - NSFOLLOWUPINSTRUCTIONS_ED_ALL_ED_FT
Follow up with the urologist as needed Rest  Increase fluids  Follow-up as directed by Psychiatry  Return here if needed        Major Depressive Disorder, Adult      Major depressive disorder (MDD) is a mental health condition. It may also be called clinical depression or unipolar depression. MDD causes symptoms of sadness, hopelessness, and loss of interest in things. These symptoms last most of the day, almost every day, for 2 weeks. MDD can also cause physical symptoms. It can interfere with relationships and with everyday activities, such as work, school, and activities that are usually pleasant.    MDD may be mild, moderate, or severe. It may be single-episode MDD, which happens once, or recurrent MDD, which may occur multiple times.      What are the causes?    The exact cause of this condition is not known. MDD is most likely caused by a combination of things, which may include:  •Your personality traits.      •Klahr or conditioned behaviors or thoughts or feelings that reinforce negativity.      •Any alcohol or substance misuse.      •Long-term (chronic) physical or mental health illness.      •Going through a traumatic experience or major life changes.        What increases the risk?    The following factors may make someone more likely to develop MDD:  •A family history of depression.      •Being a woman.      •Troubled family relationships.      •Abnormally low levels of certain brain chemicals.      •Traumatic or painful events in childhood, especially abuse or loss of a parent.      •A lot of stress from life experiences, such as poor living conditions or discrimination.      •Chronic physical illness or other mental health disorders.        What are the signs or symptoms?    The main symptoms of MDD usually include:  •Constant depressed or irritable mood.      •A loss of interest in things and activities.      Other symptoms include:  •Sleeping or eating too much or too little.      •Unexplained weight gain or weight loss.      •Tiredness or low energy.      •Being agitated, restless, or weak.      •Feeling hopeless, worthless, or guilty.      •Trouble thinking clearly or making decisions.      •Thoughts of suicide or thoughts of harming others.      •Isolating oneself or avoiding other people or activities.      •Trouble completing tasks, work, or any normal obligations.      Severe symptoms of this condition may include:  •Psychotic depression.This may include false beliefs, or delusions. It may also include seeing, hearing, tasting, smelling, or feeling things that are not real (hallucinations).      •Chronic depression or persistent depressive disorder. This is low-level depression that lasts for at least 2 years.      •Melancholic depression, or feeling extremely sad and hopeless.      •Catatonic depression, which includes trouble speaking and trouble moving.        How is this diagnosed?    This condition may be diagnosed based on:  •Your symptoms.      •Your medical and mental health history. You may be asked questions about your lifestyle, including any drug and alcohol use.      •A physical exam.      •Blood tests to rule out other conditions.      MDD is confirmed if you have the following symptoms most of the day, nearly every day, in a 2-week period:  •Either a depressed mood or loss of interest.      •At least four other MDD symptoms.        How is this treated?    This condition is usually treated by mental health professionals, such as psychologists, psychiatrists, and clinical social workers. You may need more than one type of treatment. Treatment may include:•Psychotherapy, also called talk therapy or counseling. Types of psychotherapy include:  •Cognitive behavioral therapy (CBT). This teaches you to recognize unhealthy feelings, thoughts, and behaviors, and replace them with positive thoughts and actions.      •Interpersonal therapy (IPT). This helps you to improve the way you communicate with others or relate to them.      •Family therapy. This treatment includes members of your family.        •Medicines to treat anxiety and depression. These medicines help to balance the brain chemicals that affect your emotions.    •Lifestyle changes. You may be asked to:  •Limit alcohol use and avoid drug use.      •Get regular exercise.      •Get plenty of sleep.      •Make healthy eating choices.      •Spend more time outdoors.        •Brain stimulation. This may be done if symptoms are very severe and other treatments have not worked. Examples of this treatment are electroconvulsive therapy and transcranial magnetic stimulation.        Follow these instructions at home:    Activity     •Exercise regularly and spend time outdoors.      •Find activities that you enjoy doing, and make time to do them.    •Find healthy ways to manage stress, such as:  •Meditation or deep breathing.      •Spending time in nature.      •Journaling.        •Return to your normal activities as told by your health care provider. Ask your health care provider what activities are safe for you.      Alcohol and drug use   •If you drink alcohol:•Limit how much you use to:   •0–1 drink a day for women who are not pregnant.      •0–2 drinks a day for men.        •Be aware of how much alcohol is in your drink. In the [place] one drink equals one 12 oz bottle of beer (355 mL), one 5 oz glass of wine (148 mL), or one 1½ oz glass of hard liquor (44 mL).       •Discuss your alcohol use with your health care provider. Alcohol can affect any antidepressant medicines you are taking.        •Discuss any drug use with your health care provider.        General instructions      •Take over-the-counter and prescription medicines only as told by your health care provider.      •Eat a healthy diet and get plenty of sleep.      •Consider joining a support group. Your health care provider may be able to recommend one.      •Keep all follow-up visits as told by your health care provider. This is important.        Where to find more information    •National [place] on Mental Illness: www.kirti.org      •[place] National [place] of Mental Health: www.nimh.nih.gov        Contact a health care provider if:    •Your symptoms get worse.      •You develop new symptoms.        Get help right away if:    •You self-harm.      •You have serious thoughts about hurting yourself or others.      •You hallucinate.      If you ever feel like you may hurt yourself or others, or have thoughts about taking your own life, get help right away. Go to your nearest emergency department or:   • Call your local emergency services (511 in the [place]       • Call a suicide crisis helpline, such as the National Suicide Prevention Lifeline at 1-280.839.3799. This is open 24 hours a day in the [place]       • Text the Crisis Text Line at 076110 (in the [place]         Summary    •Major depressive disorder (MDD) is a mental health condition. MDD causes symptoms of sadness, hopelessness, and loss of interest in things. These symptoms last most of the day, almost every day, for 2 weeks.      •The symptoms of MDD can interfere with relationships and with everyday activities.      •Treatments and support are available for people who develop MDD. You may need more than one type of treatment.      •Get help right away if you have serious thoughts about hurting yourself or others.      This information is not intended to replace advice given to you by your health care provider. Make sure you discuss any questions you have with your health care provider.      Document Revised: 11/28/2020 Document Reviewed: 11/28/2020    Elsevier Patient Education © 2022 Elsevier Inc.

## 2022-07-01 NOTE — ED PROVIDER NOTE - NSICDXPASTMEDICALHX_GEN_ALL_CORE_FT
PAST MEDICAL HISTORY:  Borderline personality disorder     Diabetes mellitus     Hypertension     Obesity     Sleep apnea, obstructive

## 2022-07-01 NOTE — ED PROVIDER NOTE - CARE PROVIDER_API CALL
Ramírez Pham)  Urology  [address], [address]  [place] [address]  Phone: [phone]  Fax: [phone]  Follow Up Time: 1-3 Days   Lynsey Hurd)  Psychiatry  [address] [place] [address]  Phone: [phone]  Fax: [phone]  Follow Up Time:

## 2022-07-01 NOTE — ED PROVIDER NOTE - PATIENT PORTAL LINK FT
You can access the FollowMyHealth Patient Portal offered by [facility] by registering at the following website: [email or web address]. By joining [email or web address] FollowMyHealth portal, you will also be able to view your health information using other applications (apps) compatible with our system. You can access the FollowMyHealth Patient Portal offered by [facility] by registering at the following website: [email or web address]. By joining [employer]’s FollowMyHealth portal, you will also be able to view your health information using other applications (apps) compatible with our system.

## 2022-07-02 VITALS
RESPIRATION RATE: 18 BRPM | DIASTOLIC BLOOD PRESSURE: 87 MMHG | HEART RATE: 100 BPM | OXYGEN SATURATION: 94 % | SYSTOLIC BLOOD PRESSURE: 147 MMHG | TEMPERATURE: 98 F

## 2022-07-02 DIAGNOSIS — F19.10 OTHER PSYCHOACTIVE SUBSTANCE ABUSE, UNCOMPLICATED: ICD-10-CM

## 2022-07-02 DIAGNOSIS — F32.9 MAJOR DEPRESSIVE DISORDER, SINGLE EPISODE, UNSPECIFIED: ICD-10-CM

## 2022-07-02 DIAGNOSIS — F60.3 BORDERLINE PERSONALITY DISORDER: ICD-10-CM

## 2022-07-02 PROCEDURE — 80048 BASIC METABOLIC PNL TOTAL CA: CPT

## 2022-07-02 PROCEDURE — 96374 THER/PROPH/DIAG INJ IV PUSH: CPT

## 2022-07-02 PROCEDURE — 80307 DRUG TEST PRSMV CHEM ANLYZR: CPT

## 2022-07-02 PROCEDURE — 84702 CHORIONIC GONADOTROPIN TEST: CPT

## 2022-07-02 PROCEDURE — 87635 SARS-COV-2 COVID-19 AMP PRB: CPT

## 2022-07-02 PROCEDURE — 93005 ELECTROCARDIOGRAM TRACING: CPT

## 2022-07-02 PROCEDURE — 71045 X-RAY EXAM CHEST 1 VIEW: CPT

## 2022-07-02 PROCEDURE — 99285 EMERGENCY DEPT VISIT HI MDM: CPT | Mod: 25

## 2022-07-02 PROCEDURE — 36415 COLL VENOUS BLD VENIPUNCTURE: CPT

## 2022-07-02 PROCEDURE — 85025 COMPLETE CBC W/AUTO DIFF WBC: CPT

## 2022-07-02 PROCEDURE — 94660 CPAP INITIATION&MGMT: CPT

## 2022-07-02 NOTE — ED BEHAVIORAL HEALTH ASSESSMENT NOTE - RISK ASSESSMENT
-Unmodifiable risk factors include: hx of cutting, hx of substance use, recent SA via OD, ongoing medical concerns   -Modifiable risk factors include: Current SI, ongoing mood sx, active substance abuse  -Protective factors - no prior psych admissions, no access to firearms, supportive family, engaged in treatment, positive therapeutic relationship High Acute Suicide Risk

## 2022-07-02 NOTE — PROGRESS NOTE BEHAVIORAL HEALTH - SUMMARY
This is a 23 yo woman, domiciled with family, with PMH obesity, SATISH, HTN, and longstanding psychiatric hx of borderline personality disorder and depression, never psychiatrically hospitalized, no previous suicide attempts, who presented after having ingested 70mg of amlodipine.    Since arrival to the hospital, the patient consistently reports her behavior was strictly impulsive and there was no intent die or physically harm herself. She states she engaged in this behavior because she wanted her emotional distress to stop, and as a means of indicating to her boyfriend her needs. Her behavioral dysregulation is still concerning despite the lack of suicidal intent, but the patient describes plenty of factors indicating she is at her recent baseline, and that psychiatric hospitalization has the potential to cause more emotional harm than good (as detailed in interval hx above). She has strong, stable social supports including her outpatient psychologist and family. She is able to contract to safety.

## 2022-07-02 NOTE — PROGRESS NOTE BEHAVIORAL HEALTH - RISK ASSESSMENT
Please see above.  She is at chronically elevated risk given borderline diagnosis and limited coping skills, but has been denying SI since presentation to the hospital, and based on my assessment, the patient is at higher risk of further decompensation if involuntarily hospitalized. She is not at acutely elevated safety risk at this time. Outpatient psychiatrist has been reached and appointment will be set up in next several days.

## 2022-07-02 NOTE — ED BEHAVIORAL HEALTH ASSESSMENT NOTE - PSYCHIATRIC ISSUES AND PLAN (INCLUDE STANDING AND PRN MEDICATION)
continue home meds for now (pristiq 100mg, Rixulti 2mg), consider alternative PRN instead of benzo due to SATISH (i.e. hydroxyzine 25mg BID PRN anxiety)

## 2022-07-02 NOTE — ED BEHAVIORAL HEALTH ASSESSMENT NOTE - SUMMARY
Pt is a 21 y/o single woman, living in a private residence w/her parents and 22 y/o brother, employed as an assistant at a hair salon, w/PPHx of borderline personality d/o, MDD, no prior SA, hx of non suicidal cutting > 10 years ago, no prior psychiatric admissions, currently in tx w/o/p psych NP and therapist, w/PMHx of PCOS, DM, HTN, sleep apnea on cpap, self presented w/father after impulsive OD on 6 or 7 tablets of amlodipine 10mg. Pt treated w/stomach lavage, activated charcoal upon presentation. Pt presents w/ongoing depression, SI, citing ongoing interpersonal and professional difficulties. She requires inpatient admission for safety/stabilization, to be admitted 9.27 pending bed availability.

## 2022-07-02 NOTE — ED BEHAVIORAL HEALTH ASSESSMENT NOTE - PAST PSYCHOTROPIC MEDICATION
prozac ("intrusive thoughts/SI"), Wellbutrin (anxiety), lexapro (anxiety), abilify (fatigue), seroquel (fatigue/depressed).

## 2022-07-02 NOTE — PROGRESS NOTE BEHAVIORAL HEALTH - NSBHFUPINTERVALHXFT_PSY_A_CORE
The patient was reassessed this morning, and I spoke with her at length about her emotional distress, the psychological factors that contributed to her behavior yesterday. I once again proposed the idea of psychiatric hospitalization, which was discussed w/ her last night, and she is extremely adamant that this would create more distress. She mentions wanting to remain closer to her supports and people whom she can trust, and specifically also mentions her experience of being in a residential program in  that she considers somewhat traumatizing. 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 She consistently denies any thoughts of wanting to die or self-harm. She reports her mood is back at her baseline, and states her actions were "impulsive" and "a mistake," and although somewhat minimizing of the event, is able to describe strong motivation for functioning outside the hospital setting, including the importance to her of her freedom and independence on her self-esteem & mood. She lives with both parents whom she considers strongly supportive.    I spoke with her father who was at bedside, and also altogether with the patient, and he is in strong agreement that the benefits of patient returning home, including access to her outpatient psychiatrist and family, greatly outweigh the risks. He feels she is back at her baseline and psychiatric hospitalization is very likely to be emotionally triggering. He and the patient also report the limitations for visitation would also be further triggering.    I spoke with the patient's outpatient psychiatrist Dr. Neri who reports this was an escalation in patient's behavior, and that it "came out of nowhere." Otherwise she has not been acutely different recently. He will call the father now to touch [facility], and he also states he can follow up with the patient for a full appointment in three days.

## 2022-07-02 NOTE — ED BEHAVIORAL HEALTH ASSESSMENT NOTE - DESCRIPTION
Calm, tearful but cooperative, no PRNs or restraints required.     T(C): 36.4 (07-01-22 @ 20:46), Max: 36.4 (07-01-22 @ 20:46)  HR: 114 (07-01-22 @ 22:00) (114 - 120)  BP: 158/82 (07-01-22 @ 22:00) (158/82 - 177/116)  RR: 18 (07-01-22 @ 22:00) (18 - 18)  SpO2: 96% (07-01-22 @ 22:00) (96% - 98%)    *COVID-19 Screener    -Have you been tested for COVID-19 in the last 90 days? No  -Have you received a COVID-19 vaccine? Yes ( )   -In the past 10 days, have you been around anyone with a positive COVID-19 test? No PCOS, DM, HTN, sleep apnea on cpap see above

## 2022-07-02 NOTE — ED BEHAVIORAL HEALTH ASSESSMENT NOTE - HPI (INCLUDE ILLNESS QUALITY, SEVERITY, DURATION, TIMING, CONTEXT, MODIFYING FACTORS, ASSOCIATED SIGNS AND SYMPTOMS)
Pt is a 21 y/o single woman, living in a private residence w/her parents and 20 y/o brother, employed as an assistant at a hair salon, w/PPHx of borderline personality d/o, MDD, no prior SA, hx of non suicidal cutting > 10 years ago, no prior psychiatric admissions, currently in tx w/o/p psych NP and therapist, w/PMHx of PCOS, DM, HTN, sleep apnea on cpap, self presented w/father after impulsive OD on 6 or 7 tablets of amlodipine 10mg. Pt treated w/stomach lavage, activated charcoal upon presentation.     On assessment, pt is tearful, states she impulsively took a handful of amlodipine tablets after arguing with her ex-boyfriend (states she sees him daily despite being broken up for approximately 1 year). States she wanted to "make it all go away," and did this due to feeling very overwhelmed. Endorses ongoing passive SI. She pt reports difficulty at work recently, and returned home after a particularly stressful day and wanted to rest. However, pt reports her ex repeatedly texted her to meet up and ultimately became argumentative when she refused. She then hung up, overdosed, and texted him informing him of such, prompting him to notify her father who subsequently brought her to the ED. Pt states she has felt depressed, citing dissatisfaction w/work, ongoing stressors in her relationship. Endorses daytime fatigue, intermittent insomnia, particularly nights before working as she reports anticipatory anxiety which prevents her from falling asleep or sleeping consistently. Denies VAH/paranoia/IOR. Denies current or prior sx of tray, including decreased need for sleep, grandiosity, euphoric mood, or pressured speech. States she drinks "to get drunk" on days she is not working - drinks approximately 5 drinks multiple times per week. Denies hx of ETOH w/d sx or seizure. States she uses mushrooms/LSD/cocaine/cannabis approximately once a month as well. Denies access to firearms. notes she is adherent w/medications (rexulti 2mg qhs, pristiq 100mg qhs, klonopin 0.5mg PRN anxiety), but does not find these helpful. Sees a psych NP (Gladys Moya at [facility]) as well as clinical psychologist (Dr. Herman Talbot) who she has seen for approximately 10 years.     Pt's father provided collateral to [facility] w/pt's consent, see ED BH note for details

## 2022-07-02 NOTE — PROGRESS NOTE BEHAVIORAL HEALTH - NSBHFUPINTERVALCCFT_PSY_A_CORE
This is a 21 yo woman, domiciled with family, with PMH obesity, SATISH, HTN, and longstanding psychiatric hx of borderline personality disorder and depression, never psychiatrically hospitalized, no previous suicide attempts, who presented after having ingested 70mg of amlodipine.

## 2022-07-02 NOTE — PROGRESS NOTE BEHAVIORAL HEALTH - NSBHCONSULTFOLLOWAFTERCARE_PSY_A_CORE FT
Gynecology Discharge Summary     Patient ID:  Jannette Pizarro  [id number]  34 y.o.  1989    Admit date: 6/2/2022    Discharge date: 6/3/2022     Admission Diagnoses:   Patient Active Problem List   Diagnosis Code    S/P myomectomy K66.844       Discharge Diagnoses: No discharge information exists for this patient. Patient Active Problem List   Diagnosis Code    S/P myomectomy Z98.890       Procedures for this admission: Procedure(s):  [address] Course: Uncomplicated surgery    Disposition: Home or self care    Discharged Condition: stable            Patient Instructions:   Current Discharge Medication List      START taking these medications    Details   oxyCODONE IR (Roxicodone) 5 mg immediate release tablet Take 1 Tablet by mouth every six (6) hours as needed for Pain for up to 5 days. Max Daily Amount: 20 mg.  Qty: 18 Tablet, Refills: 0    Associated Diagnoses: S/P myomectomy         CONTINUE these medications which have CHANGED    Details   !! ibuprofen (AdviL) 200 mg tablet Take 3 Tablets by mouth every six (6) hours as needed for Pain. Qty: 40 Tablet, Refills: 1      !! ibuprofen (MOTRIN) 600 mg tablet Take 1 Tablet by mouth every six (6) hours as needed for Pain. Qty: 30 Tablet, Refills: 1       !! - Potential duplicate medications found. Please discuss with provider. CONTINUE these medications which have NOT CHANGED    Details   acetaminophen (TylenoL) 325 mg tablet Take  by mouth every four (4) hours as needed for Pain. norethindrone-ethinyl estradiol (Junel 1/20, 21,) 1-20 mg-mcg tablet Take 1 Tablet by mouth daily. OTHER Indications: SEA GARCIA           Activity: Activity as tolerated, no driving while taking narcotics, nothing in vagina  Diet: Regular Diet  Wound Care: Keep wound clean and dry    Follow-up with Dr Virgil Kelley  in 2  weeks.     Signed:  Caesar Miller MD  6/3/2022  4:41 PM Continue treatment with outpatient psychiatrist & therapist

## 2022-07-02 NOTE — ED BEHAVIORAL HEALTH NOTE - BEHAVIORAL HEALTH NOTE
“Collateral (Sundar, father) has requested that the information provided remain confidential: Yes [  ] No [X]     Collateral (Sundar, father) has provided information that patient is/may be unaware of: Yes [  ] No [X]”        “Patient gives permission to obtain collateral from _____:     ( X) Yes     (  )  No     ===================     PRE-HOSPITAL COURSE     ===================     SOURCE: Nurse and Father    DETAILS: The patient was brought in by her father after taking 10 amlodipine for a suicide attempt. The patient’s ex-bf called the father, the father went to the daughter's room and inquired what occurred, which led to him bringing her to the ER.  The client was overheard telling father “I just want to stay home; I don’t want to deal with people”      ============     ED COURSE     ============     SOURCE: Nurse     ARRIVAL: Father brought her in      BELONGINGS: Father has them      BEHAVIOR: cooperative, anxious. Denies HI    TREATMENT: Charcoal      VISITORS: Father      ========================     FOR EACH COLLATERAL     ========================     NAME: Sundar     NUMBER: [phone]     RELATIONSHIP: Father     RELIABILITY: Good     COMMENTS: Connected to psych, the psych would be able to see her within the next 24 hours     ========================     PATIENT DEMOGRAPHICS: 21 y/o female domiciled in private residence with mother and father. Currently employed as a  at a hair salon    ========================     HPI     BASELINE FUNCTIONING: Employed, engages with parents     DATE HPI STARTED: Past year      DECOMPENSATION: Has appeared more withdrawn, took 10 pills this morning for SA      SUICIDALITY: Yes. First suicide attempt was today no piror hx    VIOLENCE: No     SUBSTANCE: No     ========================     PAST PSYCHIATRIC HISTORY     ========================     DATE PAST PSYCHIATRIC HISTORY STARTED: A few years ago     MAIN PSYCHIATRIC DIAGNOSIS: BPD     PSYCHIATRIC HOSPITALIZATIONS: No     PRIOR ILLNESS: No Known     SUICIDALITY: None     VIOLENCE: Self harm; cutting (8-10 years ago)     SUBSTANCE USE: Alcohol and marijuana socially     ==============     OTHER HISTORY     ==============     CURRENT MEDICATION: Rexulti, spironolactone, metformin, amlodipine      MEDICAL HISTORY: gallbladder out, tubes in ear as child     ALLERGIES: NO     LEGAL ISSUES: No     FIREARM ACCESS: No      SOCIAL HISTORY: Connected to private psychiatrist and therapist. Psychiatrist is aware that and in contact      FAMILY HISTORY: None provided     DEVELOPMENTAL HISTORY: has GED, gone to beauty school, holding job

## 2022-07-02 NOTE — ED BEHAVIORAL HEALTH NOTE - BEHAVIORAL HEALTH NOTE
Case discussed in morning telepsych rounds; patient evaluated overnight with plan for 9.27 admission. As per ED RN this morning, patient remains calm and cooperative-resting comfortably in bed. Patient remains on 1:1; mother at bedside as well. Patient did not require any PRNs overnight. Telepsych team working to locate bed that can accommodate patient's CPAP.

## 2022-07-02 NOTE — ED BEHAVIORAL HEALTH ASSESSMENT NOTE - NSSUICPROTFACT_PSY_ALL_CORE
10
Responsibility to children, family, or others/Engaged in work or school/Positive therapeutic relationships

## 2022-07-02 NOTE — ED BEHAVIORAL HEALTH ASSESSMENT NOTE - DETAILS
discussed plan of care discussed w/father see above states brother required inpatient admission for aggression deferred at this time

## 2022-07-02 NOTE — PROGRESS NOTE BEHAVIORAL HEALTH - AFFECT CONGRUENCE
Vital Signs Last 24 Hrs  T(C): 36.6 (13 Feb 2021 09:02), Max: 37.2 (12 Feb 2021 11:59)  T(F): 97.9 (13 Feb 2021 09:02), Max: 98.9 (12 Feb 2021 11:59)  HR: 84 (13 Feb 2021 09:02) (84 - 129)  BP: 125/86 (13 Feb 2021 09:02) (106/77 - 144/93)  BP(mean): --  RR: 18 (13 Feb 2021 09:02) (18 - 20)  SpO2: 99% (13 Feb 2021 09:02) (96% - 99%) Congruent Not congruent T(C): 36.3 (02-15-21 @ 04:42), Max: 37 (02-14-21 @ 20:30)  HR: 62 (02-15-21 @ 04:42) (62 - 80)  BP: 115/73 (02-15-21 @ 04:42) (100/67 - 115/73)  RR: 18 (02-15-21 @ 04:42) (18 - 18)  SpO2: 99% (02-15-21 @ 04:42) (98% - 99%) Vital Signs Last 24 Hrs  T(C): 36.9 (14 Feb 2021 12:43), Max: 36.9 (13 Feb 2021 21:06)  T(F): 98.4 (14 Feb 2021 12:43), Max: 98.4 (13 Feb 2021 21:06)  HR: 78 (14 Feb 2021 12:43) (67 - 81)  BP: 110/74 (14 Feb 2021 12:43) (110/74 - 119/74)  BP(mean): --  RR: 18 (14 Feb 2021 12:43) (18 - 18)  SpO2: 99% (14 Feb 2021 12:43) (99% - 99%)

## 2022-07-06 ENCOUNTER — APPOINTMENT (OUTPATIENT)
Dept: INTERNAL MEDICINE | Facility: CLINIC | Age: 22
End: 2022-07-06

## 2022-11-14 ENCOUNTER — OFFICE (OUTPATIENT)
Dept: URBAN - METROPOLITAN AREA CLINIC 109 | Facility: CLINIC | Age: 22
Setting detail: OPHTHALMOLOGY
End: 2022-11-14
Payer: COMMERCIAL

## 2022-11-14 DIAGNOSIS — H40.053: ICD-10-CM

## 2022-11-14 PROBLEM — E11.9 DIABETES TYPE 2 NO RETINOPATHY ; BOTH EYES: Status: ACTIVE | Noted: 2022-11-14

## 2022-11-14 PROCEDURE — 92004 COMPRE OPH EXAM NEW PT 1/>: CPT | Performed by: OPHTHALMOLOGY

## 2022-11-14 PROCEDURE — 92133 CPTRZD OPH DX IMG PST SGM ON: CPT | Performed by: OPHTHALMOLOGY

## 2022-11-14 PROCEDURE — 92020 GONIOSCOPY: CPT | Performed by: OPHTHALMOLOGY

## 2022-11-14 PROCEDURE — 76514 ECHO EXAM OF EYE THICKNESS: CPT | Performed by: OPHTHALMOLOGY

## 2022-11-14 ASSESSMENT — VISUAL ACUITY
OS_BCVA: 20/20-2
OD_BCVA: 20/20-1

## 2022-11-14 ASSESSMENT — REFRACTION_AUTOREFRACTION
OD_AXIS: 3
OS_CYLINDER: -1.75
OD_SPHERE: -3.00
OS_AXIS: 167
OD_CYLINDER: -3.50
OS_SPHERE: -3.75

## 2022-11-14 ASSESSMENT — SPHEQUIV_DERIVED
OS_SPHEQUIV: -4.625
OD_SPHEQUIV: -4.75

## 2022-11-14 ASSESSMENT — REFRACTION_CURRENTRX
OD_AXIS: 14
OS_OVR_VA: 20/
OD_OVR_VA: 20/
OS_CYLINDER: -1.00
OS_AXIS: 178
OD_SPHERE: -2.75
OS_SPHERE: -2.75
OD_CYLINDER: -1.25

## 2022-11-14 ASSESSMENT — CONFRONTATIONAL VISUAL FIELD TEST (CVF)
OD_FINDINGS: FULL
OS_FINDINGS: FULL

## 2022-11-14 ASSESSMENT — PACHYMETRY
OS_CT_UM: 559
OD_CT_UM: 555
OD_CT_CORRECTION: -1
OS_CT_CORRECTION: -1

## 2022-12-02 DIAGNOSIS — U07.1 COVID-19: ICD-10-CM

## 2023-01-20 ENCOUNTER — APPOINTMENT (OUTPATIENT)
Dept: INTERNAL MEDICINE | Facility: CLINIC | Age: 23
End: 2023-01-20
Payer: COMMERCIAL

## 2023-01-20 VITALS
SYSTOLIC BLOOD PRESSURE: 160 MMHG | RESPIRATION RATE: 18 BRPM | DIASTOLIC BLOOD PRESSURE: 110 MMHG | BODY MASS INDEX: 47.09 KG/M2 | OXYGEN SATURATION: 98 % | WEIGHT: 293 LBS | HEIGHT: 66 IN | HEART RATE: 111 BPM

## 2023-01-20 DIAGNOSIS — K64.9 UNSPECIFIED HEMORRHOIDS: ICD-10-CM

## 2023-01-20 PROBLEM — G47.33 OBSTRUCTIVE SLEEP APNEA (ADULT) (PEDIATRIC): Chronic | Status: ACTIVE | Noted: 2022-07-01

## 2023-01-20 PROCEDURE — 99214 OFFICE O/P EST MOD 30 MIN: CPT

## 2023-01-21 NOTE — PLAN
[FreeTextEntry1] : If symptoms persist, pt will see Dr Marie,  Colorectal\par RTO 1month to repeat BP

## 2023-01-21 NOTE — HISTORY OF PRESENT ILLNESS
[FreeTextEntry8] : Pt is c/o a hemorrhoid for about one month.\par Sometimes sees blood on toilet paper

## 2023-01-21 NOTE — PHYSICAL EXAM
[No Acute Distress] : no acute distress [Well Nourished] : well nourished [Well Developed] : well developed [Well-Appearing] : well-appearing [Normal Sclera/Conjunctiva] : normal sclera/conjunctiva [PERRL] : pupils equal round and reactive to light [EOMI] : extraocular movements intact [Normal Outer Ear/Nose] : the outer ears and nose were normal in appearance [Normal Oropharynx] : the oropharynx was normal [No JVD] : no jugular venous distention [No Lymphadenopathy] : no lymphadenopathy [Supple] : supple [Thyroid Normal, No Nodules] : the thyroid was normal and there were no nodules present [No Respiratory Distress] : no respiratory distress  [No Accessory Muscle Use] : no accessory muscle use [Clear to Auscultation] : lungs were clear to auscultation bilaterally [Normal Rate] : normal rate  [Regular Rhythm] : with a regular rhythm [Normal S1, S2] : normal S1 and S2 [No Murmur] : no murmur heard [No Carotid Bruits] : no carotid bruits [No Abdominal Bruit] : a ~M bruit was not heard ~T in the abdomen [No Varicosities] : no varicosities [Pedal Pulses Present] : the pedal pulses are present [No Edema] : there was no peripheral edema [No Palpable Aorta] : no palpable aorta [No Extremity Clubbing/Cyanosis] : no extremity clubbing/cyanosis [Soft] : abdomen soft [Non Tender] : non-tender [Non-distended] : non-distended [No Masses] : no abdominal mass palpated [No HSM] : no HSM [Normal Bowel Sounds] : normal bowel sounds [Normal Posterior Cervical Nodes] : no posterior cervical lymphadenopathy [Normal Anterior Cervical Nodes] : no anterior cervical lymphadenopathy [No CVA Tenderness] : no CVA  tenderness [No Spinal Tenderness] : no spinal tenderness [No Joint Swelling] : no joint swelling [Grossly Normal Strength/Tone] : grossly normal strength/tone [No Rash] : no rash [Coordination Grossly Intact] : coordination grossly intact [No Focal Deficits] : no focal deficits [Normal Gait] : normal gait [Deep Tendon Reflexes (DTR)] : deep tendon reflexes were 2+ and symmetric [Normal Affect] : the affect was normal [Normal Insight/Judgement] : insight and judgment were intact [FreeTextEntry1] : external hemorrhoid visible. Female chaperone in room for exam

## 2023-02-13 ENCOUNTER — OFFICE (OUTPATIENT)
Dept: URBAN - METROPOLITAN AREA CLINIC 109 | Facility: CLINIC | Age: 23
Setting detail: OPHTHALMOLOGY
End: 2023-02-13
Payer: COMMERCIAL

## 2023-02-13 DIAGNOSIS — H40.053: ICD-10-CM

## 2023-02-13 PROCEDURE — 99213 OFFICE O/P EST LOW 20 MIN: CPT | Performed by: OPHTHALMOLOGY

## 2023-02-13 ASSESSMENT — REFRACTION_CURRENTRX
OD_CYLINDER: -1.25
OS_SPHERE: -2.75
OD_AXIS: 14
OS_AXIS: 178
OS_CYLINDER: -1.00
OD_OVR_VA: 20/
OD_SPHERE: -2.75
OS_OVR_VA: 20/

## 2023-02-13 ASSESSMENT — REFRACTION_AUTOREFRACTION
OD_SPHERE: -3.00
OS_AXIS: 167
OD_AXIS: 3
OS_SPHERE: -3.75
OS_CYLINDER: -1.75
OD_CYLINDER: -3.50

## 2023-02-13 ASSESSMENT — SPHEQUIV_DERIVED
OS_SPHEQUIV: -4.625
OD_SPHEQUIV: -4.75

## 2023-02-13 ASSESSMENT — CONFRONTATIONAL VISUAL FIELD TEST (CVF)
OD_FINDINGS: FULL
OS_FINDINGS: FULL

## 2023-02-13 ASSESSMENT — VISUAL ACUITY
OS_BCVA: 20/20-2
OD_BCVA: 20/20-1

## 2023-03-02 NOTE — ED PROVIDER NOTE - CLINICAL SUMMARY MEDICAL DECISION MAKING FREE TEXT BOX
independent 18 yo F p/w episodes of hyperglycemia, FS 90 in triage, asymptomatic----> basic labs, provide meal

## 2023-07-28 ENCOUNTER — RX RENEWAL (OUTPATIENT)
Age: 23
End: 2023-07-28

## 2023-08-07 ENCOUNTER — OFFICE (OUTPATIENT)
Dept: URBAN - METROPOLITAN AREA CLINIC 109 | Facility: CLINIC | Age: 23
Setting detail: OPHTHALMOLOGY
End: 2023-08-07
Payer: COMMERCIAL

## 2023-08-07 DIAGNOSIS — H40.053: ICD-10-CM

## 2023-08-07 PROCEDURE — 99213 OFFICE O/P EST LOW 20 MIN: CPT | Performed by: OPHTHALMOLOGY

## 2023-08-07 PROCEDURE — 92133 CPTRZD OPH DX IMG PST SGM ON: CPT | Performed by: OPHTHALMOLOGY

## 2023-08-07 ASSESSMENT — REFRACTION_AUTOREFRACTION
OS_SPHERE: -4.25
OD_AXIS: 006
OD_SPHERE: -3.50
OS_AXIS: 165
OD_CYLINDER: -2.25
OS_CYLINDER: -1.75

## 2023-08-07 ASSESSMENT — REFRACTION_CURRENTRX
OS_SPHERE: -2.75
OD_SPHERE: -2.75
OS_OVR_VA: 20/
OD_VPRISM_DIRECTION: SV
OS_AXIS: 167
OS_VPRISM_DIRECTION: SV
OD_CYLINDER: -1.25
OS_CYLINDER: -1.00
OD_OVR_VA: 20/
OD_AXIS: 007

## 2023-08-07 ASSESSMENT — CONFRONTATIONAL VISUAL FIELD TEST (CVF)
OD_FINDINGS: FULL
OS_FINDINGS: FULL

## 2023-08-07 ASSESSMENT — VISUAL ACUITY
OD_BCVA: 20/20
OS_BCVA: 20/20-1

## 2023-08-07 ASSESSMENT — SPHEQUIV_DERIVED
OD_SPHEQUIV: -4.625
OS_SPHEQUIV: -5.125

## 2023-08-15 ENCOUNTER — APPOINTMENT (OUTPATIENT)
Dept: BARIATRICS | Facility: CLINIC | Age: 23
End: 2023-08-15
Payer: COMMERCIAL

## 2023-08-15 ENCOUNTER — NON-APPOINTMENT (OUTPATIENT)
Age: 23
End: 2023-08-15

## 2023-08-15 VITALS
HEART RATE: 84 BPM | HEIGHT: 65.5 IN | OXYGEN SATURATION: 99 % | DIASTOLIC BLOOD PRESSURE: 86 MMHG | TEMPERATURE: 97 F | SYSTOLIC BLOOD PRESSURE: 146 MMHG | BODY MASS INDEX: 48.23 KG/M2 | WEIGHT: 293 LBS

## 2023-08-15 DIAGNOSIS — R68.89 OTHER GENERAL SYMPTOMS AND SIGNS: ICD-10-CM

## 2023-08-15 DIAGNOSIS — R63.8 OTHER SYMPTOMS AND SIGNS CONCERNING FOOD AND FLUID INTAKE: ICD-10-CM

## 2023-08-15 DIAGNOSIS — R63.2 POLYPHAGIA: ICD-10-CM

## 2023-08-15 DIAGNOSIS — R63.5 ABNORMAL WEIGHT GAIN: ICD-10-CM

## 2023-08-15 DIAGNOSIS — Z01.818 ENCOUNTER FOR OTHER PREPROCEDURAL EXAMINATION: ICD-10-CM

## 2023-08-15 DIAGNOSIS — Z86.39 PERSONAL HISTORY OF OTHER ENDOCRINE, NUTRITIONAL AND METABOLIC DISEASE: ICD-10-CM

## 2023-08-15 PROCEDURE — 99205 OFFICE O/P NEW HI 60 MIN: CPT

## 2023-08-15 RX ORDER — SPIRONOLACTONE 50 MG/1
50 TABLET ORAL TWICE DAILY
Qty: 60 | Refills: 0 | Status: DISCONTINUED | COMMUNITY
Start: 2016-11-23 | End: 2023-08-15

## 2023-08-15 RX ORDER — CEFUROXIME AXETIL 500 MG/1
500 TABLET ORAL
Qty: 20 | Refills: 0 | Status: DISCONTINUED | COMMUNITY
Start: 2021-12-13 | End: 2023-08-15

## 2023-08-15 RX ORDER — NIRMATRELVIR AND RITONAVIR 300-100 MG
20 X 150 MG & KIT ORAL
Qty: 1 | Refills: 0 | Status: DISCONTINUED | COMMUNITY
Start: 2022-12-02 | End: 2023-08-15

## 2023-08-15 RX ORDER — HYDROCORTISONE 25 MG/G
2.5 CREAM TOPICAL 3 TIMES DAILY
Qty: 1 | Refills: 1 | Status: DISCONTINUED | COMMUNITY
Start: 2023-01-20 | End: 2023-08-15

## 2023-08-15 RX ORDER — ZIPRASIDONE HYDROCHLORIDE 20 MG/1
20 CAPSULE ORAL DAILY
Refills: 0 | Status: DISCONTINUED | COMMUNITY
Start: 2021-08-11 | End: 2023-08-15

## 2023-08-15 RX ORDER — FLUTICASONE PROPIONATE 50 UG/1
50 SPRAY, METERED NASAL DAILY
Qty: 1 | Refills: 0 | Status: DISCONTINUED | COMMUNITY
Start: 2017-12-16 | End: 2023-08-15

## 2023-08-15 NOTE — REVIEW OF SYSTEMS
[Patient Intake Form Reviewed] : Patient intake form was reviewed [Negative] : Allergic/Immunologic [Recent Change In Weight] : ~T recent weight change

## 2023-08-15 NOTE — PHYSICAL EXAM
[Obese, well nourished, in no acute distress] : obese, well nourished, in no acute distress [Normal] : affect appropriate [de-identified] : soft, NT, ND, no evidence of hernia; obese

## 2023-08-15 NOTE — HISTORY OF PRESENT ILLNESS
[de-identified] : 23 year old woman presents with long-standing history of morbid obesity, who has attempted numerous weight loss treatments without long term success. Patient is familiar with the Laparoscopic Sleeve Gastrectomy, the Laparoscopic Adjustable Gastric Band, and the Laparoscopic Gastric Bypass. Patient presents today to discuss options for surgery, specifically the Laparoscopic Sleeve Gastrectomy.  Patient currently is on Mounjaro with minimal weight loss.

## 2023-08-15 NOTE — ASSESSMENT
[FreeTextEntry1] : 23 year old woman with long-standing history of morbid obesity presents today to discuss options for weight loss surgery. I had an extensive discussion with the patient reviewing the Laparoscopic Sleeve Gastrectomy. Diagrams were used. All questions were answered.    Complications were discussed including but not limited to: vitamin and protein deficiencies, pneumonia, urinary infection, wound infection, leaks/peritonitis possibly requiring intraabdominal drains or reoperation, bleeding, DVT, pulmonary embolus, severe reflux, sleeve obstruction, abdominal wall hernias, gallstone formation, revisions, death, inadequate weight loss. The importance of vitamins and protein supplementation was stressed, as was the importance of follow-up and exercise.    Patient encouraged to make dietary and lifestyle changes in preparation for surgery.   30 minutes spent discussing importance of dietary and lifestyle changes for weight loss and long-term weight maintenance. Specifically discussed 3 protein focused meals per day, increase activity with cardio and strength training.   Patient was instructed to avoid pregnancy for 12-18 months post -op, until patient is at a stable weight, has normal vitamin levels and on prenatal vitamins.   Patient with a long history of morbid obesity. She is interested in the Laparoscopic Sleeve Gastrectomy. She was given written material to review. Pre-operative evaluations were reviewed. She will need to lose weight prior to surgery and will be seen again prior to surgery. She was told to call with any questions.   Would need to stop Mounjaro now prior to surgery.

## 2023-08-17 ENCOUNTER — NON-APPOINTMENT (OUTPATIENT)
Age: 23
End: 2023-08-17

## 2023-08-18 ENCOUNTER — APPOINTMENT (OUTPATIENT)
Dept: INTERNAL MEDICINE | Facility: CLINIC | Age: 23
End: 2023-08-18
Payer: COMMERCIAL

## 2023-08-18 VITALS — SYSTOLIC BLOOD PRESSURE: 134 MMHG | DIASTOLIC BLOOD PRESSURE: 90 MMHG

## 2023-08-18 VITALS
HEART RATE: 97 BPM | WEIGHT: 293 LBS | RESPIRATION RATE: 18 BRPM | TEMPERATURE: 98.3 F | HEIGHT: 65.5 IN | BODY MASS INDEX: 48.23 KG/M2 | OXYGEN SATURATION: 98 % | SYSTOLIC BLOOD PRESSURE: 160 MMHG | DIASTOLIC BLOOD PRESSURE: 110 MMHG

## 2023-08-18 DIAGNOSIS — E28.2 POLYCYSTIC OVARIAN SYNDROME: ICD-10-CM

## 2023-08-18 DIAGNOSIS — E66.01 MORBID (SEVERE) OBESITY DUE TO EXCESS CALORIES: ICD-10-CM

## 2023-08-18 DIAGNOSIS — Z00.00 ENCOUNTER FOR GENERAL ADULT MEDICAL EXAMINATION W/OUT ABNORMAL FINDINGS: ICD-10-CM

## 2023-08-18 PROCEDURE — 99395 PREV VISIT EST AGE 18-39: CPT

## 2023-08-18 NOTE — HISTORY OF PRESENT ILLNESS
[Parent] : parent [FreeTextEntry1] : Here for CPE. Pt requests a letter of support for bariatric surgery. Overall feeling well Vaxed against covid [de-identified] : Never a smoker. Social alcohol. Last GYN check-up within the past year Doesn't exercise much

## 2023-08-18 NOTE — HEALTH RISK ASSESSMENT
[Good] : ~his/her~  mood as  good [Yes] : Yes [de-identified] : social [de-identified] : Doesn't exercise much

## 2023-08-19 ENCOUNTER — LABORATORY RESULT (OUTPATIENT)
Age: 23
End: 2023-08-19

## 2023-08-22 DIAGNOSIS — D72.829 ELEVATED WHITE BLOOD CELL COUNT, UNSPECIFIED: ICD-10-CM

## 2023-08-22 LAB
25(OH)D3 SERPL-MCNC: 23.9 NG/ML
ALBUMIN SERPL ELPH-MCNC: 4.5 G/DL
ALP BLD-CCNC: 69 U/L
ALT SERPL-CCNC: 44 U/L
ANION GAP SERPL CALC-SCNC: 14 MMOL/L
APPEARANCE: CLEAR
APTT BLD: 29.5 SEC
AST SERPL-CCNC: 20 U/L
BILIRUB SERPL-MCNC: 0.4 MG/DL
BILIRUBIN URINE: NEGATIVE
BLOOD URINE: NEGATIVE
BUN SERPL-MCNC: 11 MG/DL
CALCIUM SERPL-MCNC: 9.8 MG/DL
CHLORIDE SERPL-SCNC: 98 MMOL/L
CHOLEST SERPL-MCNC: 181 MG/DL
CO2 SERPL-SCNC: 25 MMOL/L
COLOR: NORMAL
CREAT SERPL-MCNC: 0.62 MG/DL
EGFR: 127 ML/MIN/1.73M2
ESTIMATED AVERAGE GLUCOSE: 146 MG/DL
FERRITIN SERPL-MCNC: 63 NG/ML
FOLATE SERPL-MCNC: 14.9 NG/ML
GLUCOSE QUALITATIVE U: NEGATIVE MG/DL
GLUCOSE SERPL-MCNC: 179 MG/DL
HBA1C MFR BLD HPLC: 6.7 %
HCG SERPL QL: NEGATIVE
HDLC SERPL-MCNC: 40 MG/DL
INR PPP: 1.08 RATIO
IRON SATN MFR SERPL: 26 %
IRON SERPL-MCNC: 88 UG/DL
KETONES URINE: ABNORMAL MG/DL
LDLC SERPL CALC-MCNC: 102 MG/DL
LEUKOCYTE ESTERASE URINE: NEGATIVE
NITRITE URINE: NEGATIVE
NONHDLC SERPL-MCNC: 141 MG/DL
PAPP-A SERPL-ACNC: <1 MIU/ML
PH URINE: 6
POTASSIUM SERPL-SCNC: 3.7 MMOL/L
PROT SERPL-MCNC: 7.3 G/DL
PROTEIN URINE: 100 MG/DL
PT BLD: 12.2 SEC
SODIUM SERPL-SCNC: 137 MMOL/L
SPECIFIC GRAVITY URINE: 1.03
TIBC SERPL-MCNC: 336 UG/DL
TRIGL SERPL-MCNC: 224 MG/DL
TSH SERPL-ACNC: 2.16 UIU/ML
UIBC SERPL-MCNC: 249 UG/DL
UROBILINOGEN URINE: 0.2 MG/DL
VIT B1 SERPL-MCNC: 126 NMOL/L
VIT B12 SERPL-MCNC: 1168 PG/ML
VIT B6 SERPL-MCNC: 9.8 UG/L

## 2023-08-23 LAB
VIT A SERPL-MCNC: 38.2 UG/DL
ZINC SERPL-MCNC: 97 UG/DL

## 2023-08-24 ENCOUNTER — LABORATORY RESULT (OUTPATIENT)
Age: 23
End: 2023-08-24

## 2023-08-25 ENCOUNTER — NON-APPOINTMENT (OUTPATIENT)
Age: 23
End: 2023-08-25

## 2023-08-29 ENCOUNTER — APPOINTMENT (OUTPATIENT)
Dept: CARDIOLOGY | Facility: CLINIC | Age: 23
End: 2023-08-29
Payer: COMMERCIAL

## 2023-08-29 VITALS
OXYGEN SATURATION: 99 % | DIASTOLIC BLOOD PRESSURE: 103 MMHG | SYSTOLIC BLOOD PRESSURE: 153 MMHG | BODY MASS INDEX: 48.23 KG/M2 | HEART RATE: 96 BPM | WEIGHT: 293 LBS | HEIGHT: 65.5 IN

## 2023-08-29 VITALS — DIASTOLIC BLOOD PRESSURE: 100 MMHG | SYSTOLIC BLOOD PRESSURE: 148 MMHG

## 2023-08-29 DIAGNOSIS — Z86.79 PERSONAL HISTORY OF OTHER DISEASES OF THE CIRCULATORY SYSTEM: ICD-10-CM

## 2023-08-29 DIAGNOSIS — Z86.39 PERSONAL HISTORY OF OTHER ENDOCRINE, NUTRITIONAL AND METABOLIC DISEASE: ICD-10-CM

## 2023-08-29 DIAGNOSIS — R06.02 SHORTNESS OF BREATH: ICD-10-CM

## 2023-08-29 DIAGNOSIS — Z01.810 ENCOUNTER FOR PREPROCEDURAL CARDIOVASCULAR EXAMINATION: ICD-10-CM

## 2023-08-29 PROCEDURE — 93000 ELECTROCARDIOGRAM COMPLETE: CPT

## 2023-08-29 PROCEDURE — 99204 OFFICE O/P NEW MOD 45 MIN: CPT | Mod: 25

## 2023-08-29 NOTE — REVIEW OF SYSTEMS
[Dyspnea on exertion] : dyspnea during exertion [Negative] : Heme/Lymph [Chest Discomfort] : no chest discomfort [Palpitations] : no palpitations [Orthopnea] : no orthopnea [Syncope] : no syncope

## 2023-08-29 NOTE — DISCUSSION/SUMMARY
[FreeTextEntry1] : Sari is a 23yoF PMH PCOS, morbid obesity, HTN, DM (a1c 6.7%), SATISH on CPAP who presents as a referral from Dr. Aldridge for pre-op clearance prior to bariatric surgery.   She is having some SOB with exertion, otherwise denies any cardiac symptoms. Given her history of SATISH, will need TTE prior to bariatric surgery to assess for pHTN. Additionally, given her symptoms of exertional SOB, will proceed with EKG stress prior to surgery as well.   Will continue GLP1 agonist for DM.  BP somewhat elevated this morning, but had been closer to 130 at last PCP visit. Will continue Amlodipine 10mg and hold off on further antihypertensive given her upcoming surgery.  To follow up in 6 months.  [EKG obtained to assist in diagnosis and management of assessed problem(s)] : EKG obtained to assist in diagnosis and management of assessed problem(s)

## 2023-08-29 NOTE — HISTORY OF PRESENT ILLNESS
[FreeTextEntry1] : Sari is a 23yoF PMH PCOS, morbid obesity, HTN, DM (a1c 6.7%), who presents as a referral from Dr. Aldridge for pre-op clearance prior to bariatric surgery.   Sent in by Dr. Echevarria for pre-op for bariatric surgery. She is requesting an EKG, stress test and Echo.   Denies any chest pain or SOB. Has recently started exercising and going on walks and is able to do a total 0.5 mile total and felt ok and felt a little out of breath at that time. Is able to walk up 2 flights of stairs but feels slight shortness of breath with that.

## 2023-08-30 ENCOUNTER — APPOINTMENT (OUTPATIENT)
Dept: BARIATRICS/WEIGHT MGMT | Facility: CLINIC | Age: 23
End: 2023-08-30
Payer: COMMERCIAL

## 2023-08-30 PROCEDURE — 90791 PSYCH DIAGNOSTIC EVALUATION: CPT | Mod: 95

## 2023-08-30 NOTE — DISCUSSION/SUMMARY
[Educational materials provided] : Educational materials provided [Strategies to improve adherence identified] : Strategies to improve adherence identified [Addtnl Health & Behavior Intervention: Group] : Additional Health and Behavior Intervention: Group [Develop and refine illness management to behavior plan] : Develop and refine illness management to behavior plan [Identify, practice refine strategies to promote adherence to regimen] : Identify, practice refine strategies to promote adherence to regimen [Behavior plan developed] : Behavior plan developed [FreeTextEntry1] : Based on the information presented in this assessment, Ms. EUGENE does not have any current psychological contraindications for bariatric surgery.  She is a conditional candidate for surgery pending documentation from her psychiatrist or therapist corroborating this assessment and verifying compliance with treatment. [de-identified] : Social Anxiety Disorder Unspecified Depressive Disorder, by history Psychological factors (overeating, poor dietary choices) affecting other medical conditions (obesity and associated medical sequelae). Obesity [FreeTextEntry3] : Behavioral strategies were discussed to increase her coping skills and assist her in making lifestyle modifications.  Provided psychoeducational materials on changing eating behaviors in preparation for surgery.  It was recommended that she attend the post-surgery group sessions for further education and support to assist her in making lifestyle changes.  Additionally, it was recommended that she utilize writer and RD as needed to assist in making pre-surgical and post-surgical dietary and behavioral changes. [FreeTextEntry5] : compliance with dietary and behavioral recommendations  [FreeTextEntry6] : reduction of obesity related co-morbidities; reduced risk of further medical sequelae of obesity [FreeTextEntry8] : increased physical activity

## 2023-08-30 NOTE — REASON FOR VISIT
[Initial Consult] : an initial consult for [Morbid Obesity (BMI>40)] : morbid obesity (bmi>40) [Referring By:  ___] : ~Hitesh Ln~ was referred for psychological evaluation by Dr. BARNHART [Attempted Weight Loss] : attempted weight loss [Commitment to Modified Lifestyle] : commitment to a modified lifestyle pre and post surgery [Difficulties with Diet Compliance] : difficulties with diet compliance  [Expectations of Outcome] : expectations of outcome [Motivation for Selecting Surgery] : motivation for selecting surgery [Strength of Social Support System] : strength of social support system [Patient Understands Data May be Shared] : patient understands that the information discussed during the evaluation would be shared with referring provider and possibly with ~his/her~ insurance provider [Home] : at home, [unfilled] , at the time of the visit. [Other Location: e.g. Home (Enter Location, City,State)___] : at [unfilled] [Patient] : the patient [Other:____] : [unfilled] [de-identified] : for evaluation of psychological appropriateness for bariatric surgery      [de-identified] : confidentiality and its limits were discussed

## 2023-08-30 NOTE — SOCIAL HISTORY
[With Family] : lives with family [High School] : high school [None] : none [Unemployed] : unemployed [Never ] : never  [FreeTextEntry2] : was working in a hair salon, left the job 6 months ago. [FreeTextEntry3] : no children [FreeTextEntry4] : Graduated cosmetology school 1.5 years ago

## 2023-08-30 NOTE — PHYSICAL EXAM
[Normal] : good [Fair] : fair [AH] : no auditory hallucinations [VH] : no visual hallucinations [Suicidal Ideation] : no suicidal ideation [Homicidal Ideation] : no homicidal ideation [FreeTextEntry8] : "good"

## 2023-08-30 NOTE — CURRENT PSYCHIATRIC SYMPTOMS
A1C and micoralbumin.    Q   [Decreased Concentration] : no decrease in concentrating ability [Insomnia] : no insomnia disorder [de-identified] : Pt denies current depressed mood, though endorses having had a low mood in the past treated with medication. Denies any history of SI/HI/I/P.  [de-identified] : History of panic attacks in school settings, reported to be currently stable. Pt reports taking klonopin once a month on average prior to a "big event." [FreeTextEntry1] : Pt reports attending therapy every other week, having been with the same therapist since age 12. She also has a psychiatrist x 2yr at [facility] who prescribes Vraylar, Pristiq and Klonopin.

## 2023-08-30 NOTE — HISTORY OF PRESENT ILLNESS
[Genetics] : genetics [Large Portions] : large portions [Decrease Activity] : decrease activity [Mindless Eating] : mindless eating [Grazing] : grazing  [Home] : at home, [unfilled] , at the time of the visit. [Other Location: e.g. Home (Enter Location, City,State)___] : at [unfilled] [Verbal consent obtained from patient] : the patient, [unfilled] [Poor Choices] : poor choices [de-identified] : Pt's motivation for surgery is to improve her health, including DM, SATISH and HTN, to increase her level of physical activity and to improve her overall quality of life. Per pt, her highest weight was 320 lbs x 1-2 years ago and her lowest weight was 275 lbs at 17 yrs old. Per pt report, her current weight is 300 lbs. Her stated goal weight is 200 lbs and she expresses intent to make behavioral and dietary changes towards obtaining optimal results.  [de-identified] : sleeve gastrectomy   [de-identified] : online reading; discussions with surgeon; attending nutrition education classes; and reading educational materials provided by surgeon   [de-identified] : She believes eating fast food, frequent eating, lack of portion control, sedentary lifestyle, and eating mindlessly while watching tv have contributed to her obesity. [de-identified] : none.  Pt denies ED hx and bxs, including binge eating. [de-identified] : A current typical day of eating is reported as follows: B: eggs and sausage or cottage cheese with fruit L: premade chicken from [employer] with vegetables D: chicken and vegetables Snack: protein snack between lunch and dinner Drinks: water, 2 coffees per day with milk and splenda [de-identified] : keto, calorie counting with an mati, cutting carbs, eating  "healthier," increasing intake of vegetables and fruits, increasing exercise and is currently taking mounjaro injections. Despite multiple attempts at diet and exercise modifications, patient reports inability to achieve and maintain significant weight loss.

## 2023-09-12 ENCOUNTER — APPOINTMENT (OUTPATIENT)
Dept: CARDIOLOGY | Facility: CLINIC | Age: 23
End: 2023-09-12
Payer: COMMERCIAL

## 2023-09-12 ENCOUNTER — MED ADMIN CHARGE (OUTPATIENT)
Age: 23
End: 2023-09-12

## 2023-09-12 PROCEDURE — 93015 CV STRESS TEST SUPVJ I&R: CPT

## 2023-09-12 PROCEDURE — 93306 TTE W/DOPPLER COMPLETE: CPT

## 2023-09-12 RX ORDER — PERFLUTREN 6.52 MG/ML
6.52 INJECTION, SUSPENSION INTRAVENOUS
Qty: 1 | Refills: 0 | Status: COMPLETED | OUTPATIENT
Start: 2023-09-12

## 2023-09-12 RX ADMIN — PERFLUTREN MG/ML: 6.52 INJECTION, SUSPENSION INTRAVENOUS at 00:00

## 2023-09-15 ENCOUNTER — NON-APPOINTMENT (OUTPATIENT)
Age: 23
End: 2023-09-15

## 2023-10-04 ENCOUNTER — APPOINTMENT (OUTPATIENT)
Dept: BARIATRICS/WEIGHT MGMT | Facility: CLINIC | Age: 23
End: 2023-10-04
Payer: COMMERCIAL

## 2023-10-04 VITALS — BODY MASS INDEX: 48.23 KG/M2 | HEIGHT: 65.5 IN | WEIGHT: 293 LBS

## 2023-10-04 PROCEDURE — 97802 MEDICAL NUTRITION INDIV IN: CPT

## 2023-10-06 ENCOUNTER — APPOINTMENT (OUTPATIENT)
Dept: BARIATRICS | Facility: CLINIC | Age: 23
End: 2023-10-06
Payer: COMMERCIAL

## 2023-10-06 VITALS
SYSTOLIC BLOOD PRESSURE: 146 MMHG | WEIGHT: 293 LBS | TEMPERATURE: 96.7 F | BODY MASS INDEX: 48.23 KG/M2 | HEART RATE: 101 BPM | OXYGEN SATURATION: 98 % | HEIGHT: 65.5 IN | DIASTOLIC BLOOD PRESSURE: 86 MMHG

## 2023-10-06 DIAGNOSIS — Z72.3 LACK OF PHYSICAL EXERCISE: ICD-10-CM

## 2023-10-06 DIAGNOSIS — E46 UNSPECIFIED PROTEIN-CALORIE MALNUTRITION: ICD-10-CM

## 2023-10-06 PROCEDURE — 99214 OFFICE O/P EST MOD 30 MIN: CPT

## 2023-10-12 ENCOUNTER — TRANSCRIPTION ENCOUNTER (OUTPATIENT)
Age: 23
End: 2023-10-12

## 2023-10-13 ENCOUNTER — OUTPATIENT (OUTPATIENT)
Dept: OUTPATIENT SERVICES | Facility: HOSPITAL | Age: 23
LOS: 1 days | End: 2023-10-13
Payer: COMMERCIAL

## 2023-10-13 DIAGNOSIS — Z90.89 ACQUIRED ABSENCE OF OTHER ORGANS: Chronic | ICD-10-CM

## 2023-10-13 DIAGNOSIS — K21.9 GASTRO-ESOPHAGEAL REFLUX DISEASE WITHOUT ESOPHAGITIS: ICD-10-CM

## 2023-10-13 DIAGNOSIS — Z90.49 ACQUIRED ABSENCE OF OTHER SPECIFIED PARTS OF DIGESTIVE TRACT: Chronic | ICD-10-CM

## 2023-10-13 DIAGNOSIS — Z98.890 OTHER SPECIFIED POSTPROCEDURAL STATES: Chronic | ICD-10-CM

## 2023-10-13 LAB — HCG UR QL: NEGATIVE — SIGNIFICANT CHANGE UP

## 2023-10-13 PROCEDURE — 43239 EGD BIOPSY SINGLE/MULTIPLE: CPT

## 2023-10-13 PROCEDURE — 82962 GLUCOSE BLOOD TEST: CPT

## 2023-10-13 PROCEDURE — 88305 TISSUE EXAM BY PATHOLOGIST: CPT | Mod: 26

## 2023-10-13 PROCEDURE — 81025 URINE PREGNANCY TEST: CPT

## 2023-10-13 PROCEDURE — 88312 SPECIAL STAINS GROUP 1: CPT | Mod: 26

## 2023-10-13 PROCEDURE — 88305 TISSUE EXAM BY PATHOLOGIST: CPT

## 2023-10-13 PROCEDURE — 88312 SPECIAL STAINS GROUP 1: CPT

## 2023-10-13 PROCEDURE — 88313 SPECIAL STAINS GROUP 2: CPT

## 2023-10-13 PROCEDURE — 88313 SPECIAL STAINS GROUP 2: CPT | Mod: 26

## 2023-10-13 DEVICE — ESOPHAGEAL BALLOON CATH CRE FIXED WIRE 6-7-8MM: Type: IMPLANTABLE DEVICE | Status: FUNCTIONAL

## 2023-10-13 DEVICE — HEMOSPRAY HEMOSTAT ENDO 7F: Type: IMPLANTABLE DEVICE | Status: FUNCTIONAL

## 2023-10-14 ENCOUNTER — RX RENEWAL (OUTPATIENT)
Age: 23
End: 2023-10-14

## 2023-10-16 NOTE — DISCHARGE NOTE PEDIATRIC - CONDITIONS AT DISCHARGE
ASQ-3 Screen    Results: All Reassuring/Routine Follow-up           Communication: Reassuring Score: 55   Gross Motor: Reassuring Score: 60   Fine Motor: Reassuring Score: 55   Problem Solving: Reassuring Score: 55   Personal-Social: Reassuring Score: 55   Other Concerns:                 Disposition:            Tolerating clears/regular, walking with minimal assistance. Minimal post op discomfort .

## 2023-10-25 ENCOUNTER — APPOINTMENT (OUTPATIENT)
Dept: PULMONOLOGY | Facility: CLINIC | Age: 23
End: 2023-10-25
Payer: COMMERCIAL

## 2023-10-25 VITALS
BODY MASS INDEX: 48.23 KG/M2 | SYSTOLIC BLOOD PRESSURE: 139 MMHG | HEART RATE: 90 BPM | DIASTOLIC BLOOD PRESSURE: 87 MMHG | HEIGHT: 65.5 IN | WEIGHT: 293 LBS | OXYGEN SATURATION: 98 %

## 2023-10-25 DIAGNOSIS — G47.33 OBSTRUCTIVE SLEEP APNEA (ADULT) (PEDIATRIC): ICD-10-CM

## 2023-10-25 PROCEDURE — 94729 DIFFUSING CAPACITY: CPT

## 2023-10-25 PROCEDURE — 99204 OFFICE O/P NEW MOD 45 MIN: CPT | Mod: 25

## 2023-10-25 PROCEDURE — 71046 X-RAY EXAM CHEST 2 VIEWS: CPT

## 2023-10-25 PROCEDURE — 94727 GAS DIL/WSHOT DETER LNG VOL: CPT

## 2023-10-25 PROCEDURE — ZZZZZ: CPT

## 2023-10-25 PROCEDURE — 94010 BREATHING CAPACITY TEST: CPT

## 2023-10-31 ENCOUNTER — APPOINTMENT (OUTPATIENT)
Dept: BARIATRICS | Facility: CLINIC | Age: 23
End: 2023-10-31
Payer: COMMERCIAL

## 2023-10-31 ENCOUNTER — NON-APPOINTMENT (OUTPATIENT)
Age: 23
End: 2023-10-31

## 2023-10-31 VITALS
HEART RATE: 67 BPM | OXYGEN SATURATION: 98 % | HEIGHT: 65.5 IN | TEMPERATURE: 96.7 F | BODY MASS INDEX: 48.23 KG/M2 | SYSTOLIC BLOOD PRESSURE: 134 MMHG | WEIGHT: 293 LBS | DIASTOLIC BLOOD PRESSURE: 80 MMHG

## 2023-10-31 DIAGNOSIS — R73.09 OTHER ABNORMAL GLUCOSE: ICD-10-CM

## 2023-10-31 DIAGNOSIS — R74.8 ABNORMAL LEVELS OF OTHER SERUM ENZYMES: ICD-10-CM

## 2023-10-31 PROCEDURE — 99215 OFFICE O/P EST HI 40 MIN: CPT

## 2023-10-31 RX ORDER — TIRZEPATIDE 2.5 MG/.5ML
2.5 INJECTION, SOLUTION SUBCUTANEOUS
Refills: 0 | Status: DISCONTINUED | COMMUNITY
End: 2023-10-31

## 2023-11-09 ENCOUNTER — NON-APPOINTMENT (OUTPATIENT)
Age: 23
End: 2023-11-09

## 2023-11-15 ENCOUNTER — OUTPATIENT (OUTPATIENT)
Dept: OUTPATIENT SERVICES | Facility: HOSPITAL | Age: 23
LOS: 1 days | End: 2023-11-15
Payer: COMMERCIAL

## 2023-11-15 VITALS
HEART RATE: 87 BPM | OXYGEN SATURATION: 87 % | HEIGHT: 65 IN | WEIGHT: 285.06 LBS | RESPIRATION RATE: 14 BRPM | DIASTOLIC BLOOD PRESSURE: 98 MMHG | SYSTOLIC BLOOD PRESSURE: 138 MMHG | TEMPERATURE: 98 F

## 2023-11-15 DIAGNOSIS — Z01.818 ENCOUNTER FOR OTHER PREPROCEDURAL EXAMINATION: ICD-10-CM

## 2023-11-15 DIAGNOSIS — I10 ESSENTIAL (PRIMARY) HYPERTENSION: ICD-10-CM

## 2023-11-15 DIAGNOSIS — Z90.49 ACQUIRED ABSENCE OF OTHER SPECIFIED PARTS OF DIGESTIVE TRACT: Chronic | ICD-10-CM

## 2023-11-15 DIAGNOSIS — Z98.890 OTHER SPECIFIED POSTPROCEDURAL STATES: Chronic | ICD-10-CM

## 2023-11-15 DIAGNOSIS — E66.01 MORBID (SEVERE) OBESITY DUE TO EXCESS CALORIES: ICD-10-CM

## 2023-11-15 DIAGNOSIS — Z90.89 ACQUIRED ABSENCE OF OTHER ORGANS: Chronic | ICD-10-CM

## 2023-11-15 DIAGNOSIS — G47.33 OBSTRUCTIVE SLEEP APNEA (ADULT) (PEDIATRIC): ICD-10-CM

## 2023-11-15 DIAGNOSIS — E11.9 TYPE 2 DIABETES MELLITUS WITHOUT COMPLICATIONS: ICD-10-CM

## 2023-11-15 LAB
ALBUMIN SERPL ELPH-MCNC: 4.5 G/DL — SIGNIFICANT CHANGE UP (ref 3.3–5)
ALBUMIN SERPL ELPH-MCNC: 4.5 G/DL — SIGNIFICANT CHANGE UP (ref 3.3–5)
ALP SERPL-CCNC: 58 U/L — SIGNIFICANT CHANGE UP (ref 30–120)
ALP SERPL-CCNC: 58 U/L — SIGNIFICANT CHANGE UP (ref 30–120)
ALT FLD-CCNC: 144 U/L — HIGH (ref 10–60)
ALT FLD-CCNC: 144 U/L — HIGH (ref 10–60)
ANION GAP SERPL CALC-SCNC: 8 MMOL/L — SIGNIFICANT CHANGE UP (ref 5–17)
ANION GAP SERPL CALC-SCNC: 8 MMOL/L — SIGNIFICANT CHANGE UP (ref 5–17)
AST SERPL-CCNC: 56 U/L — HIGH (ref 10–40)
AST SERPL-CCNC: 56 U/L — HIGH (ref 10–40)
BILIRUB SERPL-MCNC: 0.4 MG/DL — SIGNIFICANT CHANGE UP (ref 0.2–1.2)
BILIRUB SERPL-MCNC: 0.4 MG/DL — SIGNIFICANT CHANGE UP (ref 0.2–1.2)
BLD GP AB SCN SERPL QL: SIGNIFICANT CHANGE UP
BLD GP AB SCN SERPL QL: SIGNIFICANT CHANGE UP
BUN SERPL-MCNC: 9 MG/DL — SIGNIFICANT CHANGE UP (ref 7–23)
BUN SERPL-MCNC: 9 MG/DL — SIGNIFICANT CHANGE UP (ref 7–23)
CALCIUM SERPL-MCNC: 9.7 MG/DL — SIGNIFICANT CHANGE UP (ref 8.4–10.5)
CALCIUM SERPL-MCNC: 9.7 MG/DL — SIGNIFICANT CHANGE UP (ref 8.4–10.5)
CHLORIDE SERPL-SCNC: 101 MMOL/L — SIGNIFICANT CHANGE UP (ref 96–108)
CHLORIDE SERPL-SCNC: 101 MMOL/L — SIGNIFICANT CHANGE UP (ref 96–108)
CO2 SERPL-SCNC: 28 MMOL/L — SIGNIFICANT CHANGE UP (ref 22–31)
CO2 SERPL-SCNC: 28 MMOL/L — SIGNIFICANT CHANGE UP (ref 22–31)
CREAT SERPL-MCNC: 0.7 MG/DL — SIGNIFICANT CHANGE UP (ref 0.5–1.3)
CREAT SERPL-MCNC: 0.7 MG/DL — SIGNIFICANT CHANGE UP (ref 0.5–1.3)
EGFR: 124 ML/MIN/1.73M2 — SIGNIFICANT CHANGE UP
EGFR: 124 ML/MIN/1.73M2 — SIGNIFICANT CHANGE UP
GLUCOSE SERPL-MCNC: 119 MG/DL — HIGH (ref 70–99)
GLUCOSE SERPL-MCNC: 119 MG/DL — HIGH (ref 70–99)
HCT VFR BLD CALC: 41.2 % — SIGNIFICANT CHANGE UP (ref 34.5–45)
HCT VFR BLD CALC: 41.2 % — SIGNIFICANT CHANGE UP (ref 34.5–45)
HGB BLD-MCNC: 13.4 G/DL — SIGNIFICANT CHANGE UP (ref 11.5–15.5)
HGB BLD-MCNC: 13.4 G/DL — SIGNIFICANT CHANGE UP (ref 11.5–15.5)
MCHC RBC-ENTMCNC: 28.4 PG — SIGNIFICANT CHANGE UP (ref 27–34)
MCHC RBC-ENTMCNC: 28.4 PG — SIGNIFICANT CHANGE UP (ref 27–34)
MCHC RBC-ENTMCNC: 32.5 GM/DL — SIGNIFICANT CHANGE UP (ref 32–36)
MCHC RBC-ENTMCNC: 32.5 GM/DL — SIGNIFICANT CHANGE UP (ref 32–36)
MCV RBC AUTO: 87.3 FL — SIGNIFICANT CHANGE UP (ref 80–100)
MCV RBC AUTO: 87.3 FL — SIGNIFICANT CHANGE UP (ref 80–100)
NRBC # BLD: 0 /100 WBCS — SIGNIFICANT CHANGE UP (ref 0–0)
NRBC # BLD: 0 /100 WBCS — SIGNIFICANT CHANGE UP (ref 0–0)
PLATELET # BLD AUTO: 366 K/UL — SIGNIFICANT CHANGE UP (ref 150–400)
PLATELET # BLD AUTO: 366 K/UL — SIGNIFICANT CHANGE UP (ref 150–400)
POTASSIUM SERPL-MCNC: 4.3 MMOL/L — SIGNIFICANT CHANGE UP (ref 3.5–5.3)
POTASSIUM SERPL-MCNC: 4.3 MMOL/L — SIGNIFICANT CHANGE UP (ref 3.5–5.3)
POTASSIUM SERPL-SCNC: 4.3 MMOL/L — SIGNIFICANT CHANGE UP (ref 3.5–5.3)
POTASSIUM SERPL-SCNC: 4.3 MMOL/L — SIGNIFICANT CHANGE UP (ref 3.5–5.3)
PROT SERPL-MCNC: 8.4 G/DL — HIGH (ref 6–8.3)
PROT SERPL-MCNC: 8.4 G/DL — HIGH (ref 6–8.3)
RBC # BLD: 4.72 M/UL — SIGNIFICANT CHANGE UP (ref 3.8–5.2)
RBC # BLD: 4.72 M/UL — SIGNIFICANT CHANGE UP (ref 3.8–5.2)
RBC # FLD: 12.7 % — SIGNIFICANT CHANGE UP (ref 10.3–14.5)
RBC # FLD: 12.7 % — SIGNIFICANT CHANGE UP (ref 10.3–14.5)
SODIUM SERPL-SCNC: 137 MMOL/L — SIGNIFICANT CHANGE UP (ref 135–145)
SODIUM SERPL-SCNC: 137 MMOL/L — SIGNIFICANT CHANGE UP (ref 135–145)
WBC # BLD: 9.6 K/UL — SIGNIFICANT CHANGE UP (ref 3.8–10.5)
WBC # BLD: 9.6 K/UL — SIGNIFICANT CHANGE UP (ref 3.8–10.5)
WBC # FLD AUTO: 9.6 K/UL — SIGNIFICANT CHANGE UP (ref 3.8–10.5)
WBC # FLD AUTO: 9.6 K/UL — SIGNIFICANT CHANGE UP (ref 3.8–10.5)

## 2023-11-15 PROCEDURE — G0463: CPT

## 2023-11-15 RX ORDER — GLUCAGON INJECTION, SOLUTION 0.5 MG/.1ML
1 INJECTION, SOLUTION SUBCUTANEOUS ONCE
Refills: 0 | Status: DISCONTINUED | OUTPATIENT
Start: 2023-11-27 | End: 2023-11-28

## 2023-11-15 RX ORDER — DEXTROSE 50 % IN WATER 50 %
25 SYRINGE (ML) INTRAVENOUS ONCE
Refills: 0 | Status: DISCONTINUED | OUTPATIENT
Start: 2023-11-27 | End: 2023-11-28

## 2023-11-15 RX ORDER — CLONAZEPAM 1 MG
0 TABLET ORAL
Qty: 0 | Refills: 0 | DISCHARGE

## 2023-11-15 RX ORDER — BREXPIPRAZOLE 0.25 MG/1
1 TABLET ORAL
Qty: 0 | Refills: 0 | DISCHARGE

## 2023-11-15 RX ORDER — OMEPRAZOLE 20 MG/1
20 CAPSULE, DELAYED RELEASE ORAL DAILY
Qty: 30 | Refills: 0 | Status: COMPLETED | COMMUNITY
Start: 2023-11-15 | End: 2023-12-15

## 2023-11-15 RX ORDER — DEXTROSE 50 % IN WATER 50 %
15 SYRINGE (ML) INTRAVENOUS ONCE
Refills: 0 | Status: DISCONTINUED | OUTPATIENT
Start: 2023-11-27 | End: 2023-11-28

## 2023-11-15 RX ORDER — ATOMOXETINE HYDROCHLORIDE 10 MG/1
1 CAPSULE ORAL
Qty: 0 | Refills: 0 | DISCHARGE

## 2023-11-15 RX ORDER — DEXTROSE 50 % IN WATER 50 %
12.5 SYRINGE (ML) INTRAVENOUS ONCE
Refills: 0 | Status: DISCONTINUED | OUTPATIENT
Start: 2023-11-27 | End: 2023-11-28

## 2023-11-15 RX ORDER — SEMAGLUTIDE 0.68 MG/ML
0.5 INJECTION, SOLUTION SUBCUTANEOUS
Qty: 0 | Refills: 0 | DISCHARGE

## 2023-11-15 RX ORDER — SPIRONOLACTONE 25 MG/1
0 TABLET, FILM COATED ORAL
Qty: 0 | Refills: 0 | DISCHARGE

## 2023-11-15 NOTE — H&P PST ADULT - PROBLEM SELECTOR PLAN 1
/100 right and 138/98 left   Advised to take her medications as directed, restrict salty foods and follow up with PCP ASAP.

## 2023-11-15 NOTE — H&P PST ADULT - PROBLEM SELECTOR PLAN 2
Laparoscopic sleeve gastrectomy with upper endoscopy is planned for 11/27/2023  Diagnostic testing performed  Medical clearance requested   Pre op instructions were reviewed including removing all piercings  Best wishes offered

## 2023-11-15 NOTE — H&P PST ADULT - NSICDXPASTMEDICALHX_GEN_ALL_CORE_FT
PAST MEDICAL HISTORY:  Borderline personality disorder     Diabetes mellitus, type 2     Elevated blood pressure reading with diagnosis of hypertension     Hypertension     Morbid obesity with BMI of 45.0-49.9, adult     Obesity     Sleep apnea, obstructive

## 2023-11-15 NOTE — H&P PST ADULT - NS HP PST ANES REACTION
Parents updated on plan of care via . All questions answered. TDaP vaccine given to father of baby.    No

## 2023-11-15 NOTE — H&P PST ADULT - HISTORY OF PRESENT ILLNESS
24 yo female is scheduled for laparoscopic sleeve gastrectomy with upper endoscopy on 11/15/2023 @ [facility].

## 2023-11-16 LAB
A1C WITH ESTIMATED AVERAGE GLUCOSE RESULT: 6.5 % — HIGH (ref 4–5.6)
A1C WITH ESTIMATED AVERAGE GLUCOSE RESULT: 6.5 % — HIGH (ref 4–5.6)
ESTIMATED AVERAGE GLUCOSE: 140 MG/DL — HIGH (ref 68–114)
ESTIMATED AVERAGE GLUCOSE: 140 MG/DL — HIGH (ref 68–114)

## 2023-11-17 ENCOUNTER — APPOINTMENT (OUTPATIENT)
Dept: INTERNAL MEDICINE | Facility: CLINIC | Age: 23
End: 2023-11-17
Payer: COMMERCIAL

## 2023-11-17 VITALS
RESPIRATION RATE: 14 BRPM | HEART RATE: 88 BPM | WEIGHT: 290 LBS | HEIGHT: 65.5 IN | DIASTOLIC BLOOD PRESSURE: 84 MMHG | SYSTOLIC BLOOD PRESSURE: 126 MMHG | TEMPERATURE: 98.3 F | BODY MASS INDEX: 47.74 KG/M2 | OXYGEN SATURATION: 98 %

## 2023-11-17 DIAGNOSIS — F41.9 ANXIETY DISORDER, UNSPECIFIED: ICD-10-CM

## 2023-11-17 DIAGNOSIS — F32.A ANXIETY DISORDER, UNSPECIFIED: ICD-10-CM

## 2023-11-17 DIAGNOSIS — Z01.818 ENCOUNTER FOR OTHER PREPROCEDURAL EXAMINATION: ICD-10-CM

## 2023-11-17 PROCEDURE — 99214 OFFICE O/P EST MOD 30 MIN: CPT

## 2023-11-17 RX ORDER — SODIUM CHLORIDE 9 MG/ML
1000 INJECTION, SOLUTION INTRAVENOUS
Refills: 0 | Status: DISCONTINUED | OUTPATIENT
Start: 2023-11-27 | End: 2023-11-28

## 2023-11-17 RX ORDER — DEXTROSE 50 % IN WATER 50 %
25 SYRINGE (ML) INTRAVENOUS ONCE
Refills: 0 | Status: DISCONTINUED | OUTPATIENT
Start: 2023-11-27 | End: 2023-11-28

## 2023-11-17 RX ORDER — HYDROMORPHONE HYDROCHLORIDE 2 MG/ML
0.5 INJECTION INTRAMUSCULAR; INTRAVENOUS; SUBCUTANEOUS EVERY 4 HOURS
Refills: 0 | Status: DISCONTINUED | OUTPATIENT
Start: 2023-11-27 | End: 2023-11-28

## 2023-11-17 RX ORDER — PANTOPRAZOLE SODIUM 20 MG/1
40 TABLET, DELAYED RELEASE ORAL DAILY
Refills: 0 | Status: DISCONTINUED | OUTPATIENT
Start: 2023-11-27 | End: 2023-11-28

## 2023-11-17 RX ORDER — ENOXAPARIN SODIUM 100 MG/ML
40 INJECTION SUBCUTANEOUS EVERY 24 HOURS
Refills: 0 | Status: DISCONTINUED | OUTPATIENT
Start: 2023-11-28 | End: 2023-11-28

## 2023-11-17 RX ORDER — SIMETHICONE 80 MG/1
80 TABLET, CHEWABLE ORAL EVERY 8 HOURS
Refills: 0 | Status: DISCONTINUED | OUTPATIENT
Start: 2023-11-27 | End: 2023-11-28

## 2023-11-17 RX ORDER — DEXTROSE 50 % IN WATER 50 %
12.5 SYRINGE (ML) INTRAVENOUS ONCE
Refills: 0 | Status: DISCONTINUED | OUTPATIENT
Start: 2023-11-27 | End: 2023-11-28

## 2023-11-17 RX ORDER — INSULIN LISPRO 100/ML
VIAL (ML) SUBCUTANEOUS
Refills: 0 | Status: DISCONTINUED | OUTPATIENT
Start: 2023-11-27 | End: 2023-11-28

## 2023-11-17 RX ORDER — DEXTROSE 50 % IN WATER 50 %
15 SYRINGE (ML) INTRAVENOUS ONCE
Refills: 0 | Status: DISCONTINUED | OUTPATIENT
Start: 2023-11-27 | End: 2023-11-28

## 2023-11-17 RX ORDER — SODIUM CHLORIDE 9 MG/ML
2000 INJECTION INTRAMUSCULAR; INTRAVENOUS; SUBCUTANEOUS
Refills: 0 | Status: DISCONTINUED | OUTPATIENT
Start: 2023-11-27 | End: 2023-11-28

## 2023-11-17 RX ORDER — INSULIN LISPRO 100/ML
VIAL (ML) SUBCUTANEOUS AT BEDTIME
Refills: 0 | Status: DISCONTINUED | OUTPATIENT
Start: 2023-11-27 | End: 2023-11-28

## 2023-11-17 RX ORDER — ONDANSETRON 8 MG/1
4 TABLET, FILM COATED ORAL EVERY 6 HOURS
Refills: 0 | Status: DISCONTINUED | OUTPATIENT
Start: 2023-11-27 | End: 2023-11-28

## 2023-11-17 RX ORDER — OXYCODONE HYDROCHLORIDE 5 MG/1
5 TABLET ORAL EVERY 4 HOURS
Refills: 0 | Status: DISCONTINUED | OUTPATIENT
Start: 2023-11-27 | End: 2023-11-28

## 2023-11-17 RX ORDER — GLUCAGON INJECTION, SOLUTION 0.5 MG/.1ML
1 INJECTION, SOLUTION SUBCUTANEOUS ONCE
Refills: 0 | Status: DISCONTINUED | OUTPATIENT
Start: 2023-11-27 | End: 2023-11-28

## 2023-11-17 RX ORDER — HYOSCYAMINE SULFATE 0.13 MG
0.12 TABLET ORAL EVERY 6 HOURS
Refills: 0 | Status: DISCONTINUED | OUTPATIENT
Start: 2023-11-27 | End: 2023-11-28

## 2023-11-26 ENCOUNTER — TRANSCRIPTION ENCOUNTER (OUTPATIENT)
Age: 23
End: 2023-11-26

## 2023-11-27 ENCOUNTER — TRANSCRIPTION ENCOUNTER (OUTPATIENT)
Age: 23
End: 2023-11-27

## 2023-11-27 ENCOUNTER — APPOINTMENT (OUTPATIENT)
Dept: BARIATRICS | Facility: HOSPITAL | Age: 23
End: 2023-11-27
Payer: COMMERCIAL

## 2023-11-27 ENCOUNTER — RESULT REVIEW (OUTPATIENT)
Age: 23
End: 2023-11-27

## 2023-11-27 ENCOUNTER — INPATIENT (INPATIENT)
Facility: HOSPITAL | Age: 23
LOS: 0 days | Discharge: HOME | DRG: 621 | End: 2023-11-28
Attending: SURGERY | Admitting: SURGERY
Payer: COMMERCIAL

## 2023-11-27 VITALS
SYSTOLIC BLOOD PRESSURE: 159 MMHG | RESPIRATION RATE: 19 BRPM | WEIGHT: 283.29 LBS | DIASTOLIC BLOOD PRESSURE: 88 MMHG | OXYGEN SATURATION: 97 % | TEMPERATURE: 98 F | HEIGHT: 66 IN | HEART RATE: 93 BPM

## 2023-11-27 DIAGNOSIS — Z90.89 ACQUIRED ABSENCE OF OTHER ORGANS: Chronic | ICD-10-CM

## 2023-11-27 DIAGNOSIS — E11.9 TYPE 2 DIABETES MELLITUS WITHOUT COMPLICATIONS: ICD-10-CM

## 2023-11-27 DIAGNOSIS — G47.33 OBSTRUCTIVE SLEEP APNEA (ADULT) (PEDIATRIC): ICD-10-CM

## 2023-11-27 DIAGNOSIS — Z90.49 ACQUIRED ABSENCE OF OTHER SPECIFIED PARTS OF DIGESTIVE TRACT: Chronic | ICD-10-CM

## 2023-11-27 DIAGNOSIS — I10 ESSENTIAL (PRIMARY) HYPERTENSION: ICD-10-CM

## 2023-11-27 DIAGNOSIS — Z98.890 OTHER SPECIFIED POSTPROCEDURAL STATES: Chronic | ICD-10-CM

## 2023-11-27 LAB
GLUCOSE BLDC GLUCOMTR-MCNC: 141 MG/DL — HIGH (ref 70–99)
GLUCOSE BLDC GLUCOMTR-MCNC: 141 MG/DL — HIGH (ref 70–99)
GLUCOSE BLDC GLUCOMTR-MCNC: 195 MG/DL — HIGH (ref 70–99)
GLUCOSE BLDC GLUCOMTR-MCNC: 195 MG/DL — HIGH (ref 70–99)
GLUCOSE BLDC GLUCOMTR-MCNC: 212 MG/DL — HIGH (ref 70–99)
GLUCOSE BLDC GLUCOMTR-MCNC: 212 MG/DL — HIGH (ref 70–99)
HCG UR QL: NEGATIVE — SIGNIFICANT CHANGE UP
HCG UR QL: NEGATIVE — SIGNIFICANT CHANGE UP

## 2023-11-27 PROCEDURE — 43281 LAP PARAESOPHAG HERN REPAIR: CPT | Mod: 59

## 2023-11-27 PROCEDURE — 43775 LAP SLEEVE GASTRECTOMY: CPT | Mod: 22

## 2023-11-27 PROCEDURE — 88307 TISSUE EXAM BY PATHOLOGIST: CPT | Mod: 26

## 2023-11-27 DEVICE — CLIP APPLIER ETHICON LIGAMAX 5MM: Type: IMPLANTABLE DEVICE | Status: FUNCTIONAL

## 2023-11-27 DEVICE — STAPLER COVIDIEN TRI-STAPLE 45MM PURPLE INTELLIGENT RELOAD: Type: IMPLANTABLE DEVICE | Status: FUNCTIONAL

## 2023-11-27 DEVICE — STAPLER COVIDIEN TRI-STAPLE 60MM BLACK INTELLIGENT RELOAD: Type: IMPLANTABLE DEVICE | Status: FUNCTIONAL

## 2023-11-27 DEVICE — SEALANT VISTASEAL FIBRIN HUMAN 4ML 4/PK: Type: IMPLANTABLE DEVICE | Status: FUNCTIONAL

## 2023-11-27 RX ORDER — AMLODIPINE BESYLATE 2.5 MG/1
1 TABLET ORAL
Qty: 0 | Refills: 0 | DISCHARGE

## 2023-11-27 RX ORDER — ONDANSETRON 8 MG/1
4 TABLET, FILM COATED ORAL ONCE
Refills: 0 | Status: DISCONTINUED | OUTPATIENT
Start: 2023-11-27 | End: 2023-11-27

## 2023-11-27 RX ORDER — ACETAMINOPHEN 500 MG
1000 TABLET ORAL EVERY 6 HOURS
Refills: 0 | Status: COMPLETED | OUTPATIENT
Start: 2023-11-27 | End: 2023-11-28

## 2023-11-27 RX ORDER — HYDROMORPHONE HYDROCHLORIDE 2 MG/ML
0.2 INJECTION INTRAMUSCULAR; INTRAVENOUS; SUBCUTANEOUS
Refills: 0 | Status: DISCONTINUED | OUTPATIENT
Start: 2023-11-27 | End: 2023-11-27

## 2023-11-27 RX ORDER — SIMETHICONE 80 MG/1
1 TABLET, CHEWABLE ORAL
Qty: 0 | Refills: 0 | DISCHARGE

## 2023-11-27 RX ORDER — INFLUENZA VIRUS VACCINE 15; 15; 15; 15 UG/.5ML; UG/.5ML; UG/.5ML; UG/.5ML
0.5 SUSPENSION INTRAMUSCULAR ONCE
Refills: 0 | Status: DISCONTINUED | OUTPATIENT
Start: 2023-11-27 | End: 2023-11-28

## 2023-11-27 RX ORDER — CEFAZOLIN SODIUM 1 G
3000 VIAL (EA) INJECTION ONCE
Refills: 0 | Status: COMPLETED | OUTPATIENT
Start: 2023-11-27 | End: 2023-11-27

## 2023-11-27 RX ORDER — METFORMIN HYDROCHLORIDE 850 MG/1
1 TABLET ORAL
Qty: 0 | Refills: 0 | DISCHARGE

## 2023-11-27 RX ORDER — ONDANSETRON 8 MG/1
1 TABLET, FILM COATED ORAL
Qty: 0 | Refills: 0 | DISCHARGE

## 2023-11-27 RX ORDER — DESVENLAFAXINE 50 MG/1
50 TABLET, EXTENDED RELEASE ORAL
Refills: 0 | DISCHARGE

## 2023-11-27 RX ORDER — ACETAMINOPHEN 500 MG
1000 TABLET ORAL
Qty: 0 | Refills: 0 | DISCHARGE

## 2023-11-27 RX ORDER — ENOXAPARIN SODIUM 100 MG/ML
40 INJECTION SUBCUTANEOUS ONCE
Refills: 0 | Status: COMPLETED | OUTPATIENT
Start: 2023-11-27 | End: 2023-11-27

## 2023-11-27 RX ORDER — CHLORHEXIDINE GLUCONATE 213 G/1000ML
1 SOLUTION TOPICAL ONCE
Refills: 0 | Status: COMPLETED | OUTPATIENT
Start: 2023-11-27 | End: 2023-11-27

## 2023-11-27 RX ORDER — SODIUM CHLORIDE 9 MG/ML
1000 INJECTION, SOLUTION INTRAVENOUS
Refills: 0 | Status: DISCONTINUED | OUTPATIENT
Start: 2023-11-27 | End: 2023-11-27

## 2023-11-27 RX ORDER — ACETAMINOPHEN 500 MG
1000 TABLET ORAL EVERY 6 HOURS
Refills: 0 | Status: DISCONTINUED | OUTPATIENT
Start: 2023-11-28 | End: 2023-11-28

## 2023-11-27 RX ORDER — CLONAZEPAM 1 MG
1 TABLET ORAL
Refills: 0 | DISCHARGE

## 2023-11-27 RX ORDER — OXYCODONE HYDROCHLORIDE 5 MG/1
1 TABLET ORAL
Qty: 0 | Refills: 0 | DISCHARGE

## 2023-11-27 RX ORDER — HYDROMORPHONE HYDROCHLORIDE 2 MG/ML
0.5 INJECTION INTRAMUSCULAR; INTRAVENOUS; SUBCUTANEOUS
Refills: 0 | Status: DISCONTINUED | OUTPATIENT
Start: 2023-11-27 | End: 2023-11-27

## 2023-11-27 RX ORDER — FOSAPREPITANT DIMEGLUMINE 150 MG/5ML
150 INJECTION, POWDER, LYOPHILIZED, FOR SOLUTION INTRAVENOUS ONCE
Refills: 0 | Status: COMPLETED | OUTPATIENT
Start: 2023-11-27 | End: 2023-11-27

## 2023-11-27 RX ORDER — DESVENLAFAXINE 50 MG/1
1 TABLET, EXTENDED RELEASE ORAL
Qty: 0 | Refills: 0 | DISCHARGE

## 2023-11-27 RX ORDER — ACETAMINOPHEN 500 MG
1000 TABLET ORAL ONCE
Refills: 0 | Status: COMPLETED | OUTPATIENT
Start: 2023-11-27 | End: 2023-11-27

## 2023-11-27 RX ORDER — IBUPROFEN 200 MG
800 TABLET ORAL EVERY 6 HOURS
Refills: 0 | Status: DISCONTINUED | OUTPATIENT
Start: 2023-11-27 | End: 2023-11-28

## 2023-11-27 RX ADMIN — Medication 400 MILLIGRAM(S): at 23:09

## 2023-11-27 RX ADMIN — ONDANSETRON 4 MILLIGRAM(S): 8 TABLET, FILM COATED ORAL at 23:44

## 2023-11-27 RX ADMIN — Medication 400 MILLIGRAM(S): at 18:57

## 2023-11-27 RX ADMIN — SODIUM CHLORIDE 1000 MILLILITER(S): 9 INJECTION INTRAMUSCULAR; INTRAVENOUS; SUBCUTANEOUS at 09:51

## 2023-11-27 RX ADMIN — CHLORHEXIDINE GLUCONATE 1 APPLICATION(S): 213 SOLUTION TOPICAL at 09:51

## 2023-11-27 RX ADMIN — FOSAPREPITANT DIMEGLUMINE 300 MILLIGRAM(S): 150 INJECTION, POWDER, LYOPHILIZED, FOR SOLUTION INTRAVENOUS at 09:52

## 2023-11-27 RX ADMIN — ENOXAPARIN SODIUM 40 MILLIGRAM(S): 100 INJECTION SUBCUTANEOUS at 10:09

## 2023-11-27 RX ADMIN — SODIUM CHLORIDE 75 MILLILITER(S): 9 INJECTION, SOLUTION INTRAVENOUS at 14:47

## 2023-11-27 RX ADMIN — Medication 2.5 MILLIGRAM(S): at 18:30

## 2023-11-27 RX ADMIN — Medication 800 MILLIGRAM(S): at 23:52

## 2023-11-27 RX ADMIN — SIMETHICONE 80 MILLIGRAM(S): 80 TABLET, CHEWABLE ORAL at 18:19

## 2023-11-27 RX ADMIN — ONDANSETRON 4 MILLIGRAM(S): 8 TABLET, FILM COATED ORAL at 18:31

## 2023-11-27 RX ADMIN — Medication 2.5 MILLIGRAM(S): at 23:45

## 2023-11-27 RX ADMIN — Medication 1000 MILLIGRAM(S): at 19:01

## 2023-11-27 RX ADMIN — Medication 400 MILLIGRAM(S): at 15:39

## 2023-11-27 NOTE — DISCHARGE NOTE PROVIDER - NSDCFUADDINST_GEN_ALL_CORE_FT
Increase ambulation and utilize incentive spirometry frequently.   Apply ice packs to abdominal wall/incision sites for 20 mins on/20 mins off every 4 hours for the next 24 hours and to shoulders as needed for discomfort.   Continue Bariatric Phase 1 Diet and follow nutritional guidelines as instructed.  Take liquid/powder Tylenol 1000mg every 6 hours for a minimum of 3 days and a maximum of 5 days. Take oxycodone as needed for breakthrough pain. If taking narcotic pain medications, may take Colace/OTC stool softener (whole) as directed to prevent constipation.

## 2023-11-27 NOTE — PRE-OP CHECKLIST - HAND OFF
Unit RN to OR RN Peng Advancement Flap Text: The defect edges were debeveled with a #15 scalpel blade.  Given the location of the defect, shape of the defect and the proximity to free margins a Peng advancement flap was deemed most appropriate.  Using a sterile surgical marker, an appropriate advancement flap was drawn incorporating the defect and placing the expected incisions within the relaxed skin tension lines where possible. The area thus outlined was incised deep to adipose tissue with a #15 scalpel blade.  The skin margins were undermined to an appropriate distance in all directions utilizing iris scissors.

## 2023-11-27 NOTE — BRIEF OPERATIVE NOTE - NSICDXBRIEFPROCEDURE_GEN_ALL_CORE_FT
PROCEDURES:  Laparoscopic sleeve gastrectomy with laparoscopic repair of hiatal hernia if indicated 27-Nov-2023 14:19:07  Rachana Humphrey

## 2023-11-27 NOTE — DISCHARGE NOTE PROVIDER - CARE PROVIDER_API CALL
Sayra Hernandez  Surgery  [address] [place] [address], [address] [place] [address]  Phone: [phone]  Fax: [phone]  Follow Up Time: 1 week

## 2023-11-27 NOTE — DISCHARGE NOTE PROVIDER - NSDCFUSCHEDAPPT_GEN_ALL_CORE_FT
Sayra Hernandez  [facility]  BARIATRIC [address]  Scheduled Appointment: 12/05/2023    Sayra Hernandez  [facility]  BARIATRIC [address]  Scheduled Appointment: 12/29/2023    [facility]  WEIGHTMGMT [address]  Scheduled Appointment: 01/09/2024     Sayra Hernandez  [facility]  BARIATRIC [address]  Scheduled Appointment: 12/05/2023    Sayra Hernandez  [facility]  BARIATRIC [address]  Scheduled Appointment: 12/29/2023    [facility]  WEIGHTMGMT [address]  Scheduled Appointment: 01/09/2024    Dacia Dunlap  [facility]  CARDIOLOGY [address]  Scheduled Appointment: 02/26/2024

## 2023-11-27 NOTE — PATIENT PROFILE ADULT - IS PATIENT POST-MENOPAUSAL?
----- Message from Eli Zhang NP sent at 7/7/2018  9:06 AM CDT -----  I would just follow up with 2 thoughts- 1. Did she recently or does she donate blood? Her level was mildly low. She goes up and down so this level has been consistent. She would benefit from taking a multivitamin with iron (if she takes one that states 55 and older, it will not have iron) 2. She would benefit from adding vitamin D3 capsule daily. To help with D absorption, magnesium should be added so what most will do is buy a calcium/D/magnesium supplement in addition to the multivit. Let me know if questions.   no

## 2023-11-27 NOTE — BRIEF OPERATIVE NOTE - OPERATION/FINDINGS
Patient underwent laparoscopic sleeve gastrectomy over 36 Lao bougie and primary repair of hiatal hernia without any complications. Intraoperative EGD confirmed intact nonbleeding staple line with negative leak test. Vistaseal applied for hemostasis.

## 2023-11-27 NOTE — DISCHARGE NOTE PROVIDER - NSDCMRMEDTOKEN_GEN_ALL_CORE_FT
Bariatric Fusion Mixed Berry Complete oral tablet, chewable: 1 orally 4 times a day  desvenlafaxine (as base) 50 mg oral tablet, extended release: 50 milligram(s) orally once a day  desvenlafaxine (as succinate) 100 mg oral tablet, extended release: 1 tab(s) orally once a day  Gas-X Extra Strength 125 mg oral tablet, chewable: 1 tab(s) chewed every 8 hours as needed for  gas pain  KlonoPIN 0.5 mg oral tablet: 1 tab(s) orally 3 times a day as needed for  anxiety  metFORMIN 1000 mg oral tablet: 1 tab(s) orally 2 times a day  Mounjaro 7.5 mg/0.5 mL subcutaneous solution: 7.5 milligram(s) subcutaneously every 7 days  Norvasc 10 mg oral tablet: 1 tab(s) orally once a day  omeprazole 20 mg oral delayed release capsule: 1 cap(s) orally once a day Open capsule and take with one teaspoon of low-fat yogurt or applesauce  ondansetron 4 mg oral tablet, disintegratin tab(s) orally every 6 hours as needed for  nausea  oxyCODONE 5 mg oral tablet: 1 tab(s) orally every 6 hours as needed for  severe pain  Tylenol Extra Strength 500 mg oral powder: 1,000 milligram(s) orally every 6 hours   Bariatric Fusion Mixed Berry Complete oral tablet, chewable: 1 orally 4 times a day  desvenlafaxine (as base) 50 mg oral tablet, extended release: 50 milligram(s) orally once a day  desvenlafaxine (as succinate) 100 mg oral tablet, extended release: 1 tab(s) orally once a day  Gas-X Extra Strength 125 mg oral tablet, chewable: 1 tab(s) chewed every 8 hours as needed for  gas pain  KlonoPIN 0.5 mg oral tablet: 1 tab(s) orally 3 times a day as needed for  anxiety  metFORMIN 1000 mg oral tablet: 1 tab(s) orally 2 times a day  Norvasc 10 mg oral tablet: 1 tab(s) orally once a day  omeprazole 20 mg oral delayed release capsule: 1 cap(s) orally once a day Open capsule and take with one teaspoon of low-fat yogurt or applesauce  ondansetron 4 mg oral tablet, disintegratin tab(s) orally every 6 hours as needed for  nausea  oxyCODONE 5 mg oral tablet: 1 tab(s) orally every 6 hours as needed for  severe pain  Tylenol Extra Strength 500 mg oral powder: 1,000 milligram(s) orally every 6 hours

## 2023-11-27 NOTE — DISCHARGE NOTE PROVIDER - HOSPITAL COURSE
24 y/o female underwent laparoscopic sleeve gastrectomy with hiatal hernia repair. Patient tolerated procedure well and progressed appropriately. Currently tolerating Bariatric Phase 1 Clears diet, voiding independently with appropriate volume and ambulating. Nutritional guidelines were reviewed with Nutritionist and patient instructed to drink small frequent amounts, start protein drinks and follow dietary guidelines.  Discharged on 11/28/23 with home medications, incentive spirometer and PRESCRIPTIONS/MEDS TO BED to follow-up with Bariatric Surgeon/Dr. DESI Hernandez in 1 week.

## 2023-11-27 NOTE — PATIENT PROFILE ADULT - FALL HARM RISK - FACTORS NURSING JUDGEMENT
Mid-Level Procedure Text (A): After obtaining clear surgical margins the patient was sent to a mid-level provider for surgical repair.  The patient understands they will receive post-surgical care and follow-up from the mid-level provider. No

## 2023-11-28 ENCOUNTER — TRANSCRIPTION ENCOUNTER (OUTPATIENT)
Age: 23
End: 2023-11-28

## 2023-11-28 VITALS
DIASTOLIC BLOOD PRESSURE: 91 MMHG | HEART RATE: 79 BPM | TEMPERATURE: 98 F | SYSTOLIC BLOOD PRESSURE: 130 MMHG | OXYGEN SATURATION: 97 % | RESPIRATION RATE: 16 BRPM

## 2023-11-28 DIAGNOSIS — E66.01 MORBID (SEVERE) OBESITY DUE TO EXCESS CALORIES: ICD-10-CM

## 2023-11-28 LAB
ANION GAP SERPL CALC-SCNC: 11 MMOL/L — SIGNIFICANT CHANGE UP (ref 5–17)
ANION GAP SERPL CALC-SCNC: 11 MMOL/L — SIGNIFICANT CHANGE UP (ref 5–17)
BUN SERPL-MCNC: 6 MG/DL — LOW (ref 7–23)
BUN SERPL-MCNC: 6 MG/DL — LOW (ref 7–23)
CALCIUM SERPL-MCNC: 9.1 MG/DL — SIGNIFICANT CHANGE UP (ref 8.4–10.5)
CALCIUM SERPL-MCNC: 9.1 MG/DL — SIGNIFICANT CHANGE UP (ref 8.4–10.5)
CHLORIDE SERPL-SCNC: 101 MMOL/L — SIGNIFICANT CHANGE UP (ref 96–108)
CHLORIDE SERPL-SCNC: 101 MMOL/L — SIGNIFICANT CHANGE UP (ref 96–108)
CO2 SERPL-SCNC: 27 MMOL/L — SIGNIFICANT CHANGE UP (ref 22–31)
CO2 SERPL-SCNC: 27 MMOL/L — SIGNIFICANT CHANGE UP (ref 22–31)
CREAT SERPL-MCNC: 0.66 MG/DL — SIGNIFICANT CHANGE UP (ref 0.5–1.3)
CREAT SERPL-MCNC: 0.66 MG/DL — SIGNIFICANT CHANGE UP (ref 0.5–1.3)
EGFR: 126 ML/MIN/1.73M2 — SIGNIFICANT CHANGE UP
EGFR: 126 ML/MIN/1.73M2 — SIGNIFICANT CHANGE UP
GLUCOSE BLDC GLUCOMTR-MCNC: 137 MG/DL — HIGH (ref 70–99)
GLUCOSE BLDC GLUCOMTR-MCNC: 137 MG/DL — HIGH (ref 70–99)
GLUCOSE BLDC GLUCOMTR-MCNC: 161 MG/DL — HIGH (ref 70–99)
GLUCOSE BLDC GLUCOMTR-MCNC: 161 MG/DL — HIGH (ref 70–99)
GLUCOSE SERPL-MCNC: 163 MG/DL — HIGH (ref 70–99)
GLUCOSE SERPL-MCNC: 163 MG/DL — HIGH (ref 70–99)
HCT VFR BLD CALC: 34.9 % — SIGNIFICANT CHANGE UP (ref 34.5–45)
HCT VFR BLD CALC: 34.9 % — SIGNIFICANT CHANGE UP (ref 34.5–45)
HGB BLD-MCNC: 11.5 G/DL — SIGNIFICANT CHANGE UP (ref 11.5–15.5)
HGB BLD-MCNC: 11.5 G/DL — SIGNIFICANT CHANGE UP (ref 11.5–15.5)
MCHC RBC-ENTMCNC: 28.8 PG — SIGNIFICANT CHANGE UP (ref 27–34)
MCHC RBC-ENTMCNC: 28.8 PG — SIGNIFICANT CHANGE UP (ref 27–34)
MCHC RBC-ENTMCNC: 33 GM/DL — SIGNIFICANT CHANGE UP (ref 32–36)
MCHC RBC-ENTMCNC: 33 GM/DL — SIGNIFICANT CHANGE UP (ref 32–36)
MCV RBC AUTO: 87.5 FL — SIGNIFICANT CHANGE UP (ref 80–100)
MCV RBC AUTO: 87.5 FL — SIGNIFICANT CHANGE UP (ref 80–100)
NRBC # BLD: 0 /100 WBCS — SIGNIFICANT CHANGE UP (ref 0–0)
NRBC # BLD: 0 /100 WBCS — SIGNIFICANT CHANGE UP (ref 0–0)
PLATELET # BLD AUTO: 297 K/UL — SIGNIFICANT CHANGE UP (ref 150–400)
PLATELET # BLD AUTO: 297 K/UL — SIGNIFICANT CHANGE UP (ref 150–400)
POTASSIUM SERPL-MCNC: 3.7 MMOL/L — SIGNIFICANT CHANGE UP (ref 3.5–5.3)
POTASSIUM SERPL-MCNC: 3.7 MMOL/L — SIGNIFICANT CHANGE UP (ref 3.5–5.3)
POTASSIUM SERPL-SCNC: 3.7 MMOL/L — SIGNIFICANT CHANGE UP (ref 3.5–5.3)
POTASSIUM SERPL-SCNC: 3.7 MMOL/L — SIGNIFICANT CHANGE UP (ref 3.5–5.3)
RBC # BLD: 3.99 M/UL — SIGNIFICANT CHANGE UP (ref 3.8–5.2)
RBC # BLD: 3.99 M/UL — SIGNIFICANT CHANGE UP (ref 3.8–5.2)
RBC # FLD: 13.3 % — SIGNIFICANT CHANGE UP (ref 10.3–14.5)
RBC # FLD: 13.3 % — SIGNIFICANT CHANGE UP (ref 10.3–14.5)
SODIUM SERPL-SCNC: 139 MMOL/L — SIGNIFICANT CHANGE UP (ref 135–145)
SODIUM SERPL-SCNC: 139 MMOL/L — SIGNIFICANT CHANGE UP (ref 135–145)
WBC # BLD: 16.09 K/UL — HIGH (ref 3.8–10.5)
WBC # BLD: 16.09 K/UL — HIGH (ref 3.8–10.5)
WBC # FLD AUTO: 16.09 K/UL — HIGH (ref 3.8–10.5)
WBC # FLD AUTO: 16.09 K/UL — HIGH (ref 3.8–10.5)

## 2023-11-28 PROCEDURE — 80048 BASIC METABOLIC PNL TOTAL CA: CPT

## 2023-11-28 PROCEDURE — C1889: CPT

## 2023-11-28 PROCEDURE — 36415 COLL VENOUS BLD VENIPUNCTURE: CPT

## 2023-11-28 PROCEDURE — 82962 GLUCOSE BLOOD TEST: CPT

## 2023-11-28 PROCEDURE — 85027 COMPLETE CBC AUTOMATED: CPT

## 2023-11-28 PROCEDURE — 99222 1ST HOSP IP/OBS MODERATE 55: CPT

## 2023-11-28 PROCEDURE — 88307 TISSUE EXAM BY PATHOLOGIST: CPT

## 2023-11-28 PROCEDURE — 94660 CPAP INITIATION&MGMT: CPT

## 2023-11-28 PROCEDURE — 81025 URINE PREGNANCY TEST: CPT

## 2023-11-28 RX ORDER — TIRZEPATIDE 15 MG/.5ML
7.5 INJECTION, SOLUTION SUBCUTANEOUS
Refills: 0 | DISCHARGE

## 2023-11-28 RX ADMIN — Medication 400 MILLIGRAM(S): at 01:57

## 2023-11-28 RX ADMIN — Medication 2.5 MILLIGRAM(S): at 12:56

## 2023-11-28 RX ADMIN — Medication 400 MILLIGRAM(S): at 06:47

## 2023-11-28 RX ADMIN — HYDROMORPHONE HYDROCHLORIDE 0.5 MILLIGRAM(S): 2 INJECTION INTRAMUSCULAR; INTRAVENOUS; SUBCUTANEOUS at 04:33

## 2023-11-28 RX ADMIN — ONDANSETRON 4 MILLIGRAM(S): 8 TABLET, FILM COATED ORAL at 12:56

## 2023-11-28 RX ADMIN — HYDROMORPHONE HYDROCHLORIDE 0.5 MILLIGRAM(S): 2 INJECTION INTRAMUSCULAR; INTRAVENOUS; SUBCUTANEOUS at 03:57

## 2023-11-28 RX ADMIN — Medication 400 MILLIGRAM(S): at 09:09

## 2023-11-28 RX ADMIN — Medication 1000 MILLIGRAM(S): at 02:52

## 2023-11-28 RX ADMIN — SODIUM CHLORIDE 150 MILLILITER(S): 9 INJECTION, SOLUTION INTRAVENOUS at 09:09

## 2023-11-28 RX ADMIN — Medication 400 MILLIGRAM(S): at 14:57

## 2023-11-28 RX ADMIN — Medication 1: at 07:55

## 2023-11-28 RX ADMIN — ONDANSETRON 4 MILLIGRAM(S): 8 TABLET, FILM COATED ORAL at 06:03

## 2023-11-28 RX ADMIN — Medication 2.5 MILLIGRAM(S): at 06:03

## 2023-11-28 RX ADMIN — PANTOPRAZOLE SODIUM 40 MILLIGRAM(S): 20 TABLET, DELAYED RELEASE ORAL at 12:56

## 2023-11-28 RX ADMIN — ENOXAPARIN SODIUM 40 MILLIGRAM(S): 100 INJECTION SUBCUTANEOUS at 07:56

## 2023-11-28 NOTE — CARE COORDINATION ASSESSMENT. - NSCAREPROVIDERS_GEN_ALL_CORE_FT
CARE PROVIDERS:  Administration: Omar Palmer  Admitting: Sayra Hernandez  Attending: Sayra Hernandez  Case Management: Santaromana, Anna  Consultant: Bella Marcum  Consultant: Stephane Collado  Consultant: Guillermo Roque  Consultant: Yvonne Chau  Consultant: Kita Santana  Covering Team: Troy Perez  Nurse: José Miguel Rangel  Ordered: Physician, Ordering  Ordered: Elvira Awan  Override: Rashida Berumen  PCA/Nursing Assistant: Eugene Mcintosh  PCA/Nursing Assistant: Alec Freeman  Primary Team: Rachana Humphrey  Primary Team: Marlin Otero  Primary Team: Sudha Cartagena  Registered Dietitian: Shanta Fox  Respiratory Therapy: Olman Pollock  : Evelia Shelton  : Celeste Gary  Team: VALERY  Hospitalists, Team  UR// Supp. Assoc.: Veronica Nunn

## 2023-11-28 NOTE — PHARMACOTHERAPY INTERVENTION NOTE - NSPHMCOMMSECONDPHM_PHM_A_CORE FT
Meds to Bed (M2B) received from [facility] pharmacy were delivered to patient at bed side.  Pharmacy Staff did a final review/inquiry with the patient to ensure understanding and use of medication that was provided. Patient questions or concerns about their discharge drug therapy were addressed for their transition home.  All issues were addressed and well wishes provided.

## 2023-11-28 NOTE — DISCHARGE NOTE NURSING/CASE MANAGEMENT/SOCIAL WORK - PATIENT PORTAL LINK FT
You can access the FollowMyHealth Patient Portal offered by [facility] by registering at the following website: [email or web address]. By joining [employer]’s FollowMyHealth portal, you will also be able to view your health information using other applications (apps) compatible with our system.

## 2023-11-28 NOTE — CARE COORDINATION ASSESSMENT. - NSPASTMEDSURGHISTORY_GEN_ALL_CORE_FT
PAST MEDICAL & SURGICAL HISTORY:  Obesity      H/O myringotomy  with tubes      H/O adenoidectomy      Hypertension      S/P cholecystectomy      Borderline personality disorder      Sleep apnea, obstructive      Elevated blood pressure reading with diagnosis of hypertension      Morbid obesity with BMI of 45.0-49.9, adult      Diabetes mellitus, type 2

## 2023-11-28 NOTE — SBIRT NOTE ADULT - NSSBIRTDRGBRIEFINTDET_GEN_A_CORE
patient admits to vaping recreational marijuana feels she can abstain and plans to avoid  Consent Type: Consent 1 (Standard)

## 2023-11-28 NOTE — CONSULT NOTE ADULT - ASSESSMENT
24 yo female, pmh of DM, htn, mariah, obesity, depression, presenting for sleeve gastrectomy  - dvt ppx, pain control, diet as per surgery, tolerating clears  - can continue home norvasc on DC  - hold mounjaro  - continue home desvenlafaxine  - advised against concomitant opiate, benzo use, patient verbalized understanding  - no medical contraindication to DC  - following up with Bariatric surgery in 1 week    
Pt. stable postop gastric sleeve.  Hx SATISH on bipap 25/7.  Suggest incentive jose c, dvt prophylaxis.  Ambulate.  FU pulmonary post discharge.

## 2023-11-28 NOTE — PROGRESS NOTE ADULT - SUBJECTIVE AND OBJECTIVE BOX
PULMONARY/CRITICAL CARE  DOS 11/28/23  Stable, mild abd discomfort. Ambulated. Wore bipap part of nite.       Patient is a 23y old  Female who presents with a chief complaint of laparoscopic sleeve gastrectomy (27 Nov 2023 14:20)  Stable postop  BRIEF HOSPITAL COURSE: ***    Events last 24 hours: ***    PAST MEDICAL & SURGICAL HISTORY:  Obesity      Hypertension      Borderline personality disorder      Sleep apnea, obstructive      Diabetes mellitus, type 2      Morbid obesity with BMI of 45.0-49.9, adult      Elevated blood pressure reading with diagnosis of hypertension      H/O adenoidectomy      H/O myringotomy  with tubes      S/P cholecystectomy        Allergies    No Known Allergies    Intolerances      FAMILY HISTORY: unemployed no cigs, etoh  Family history of diabetes mellitus (Mother)            Medications:    enalaprilat Injectable 2.5 milliGRAM(s) IV Push every 6 hours      acetaminophen   IVPB .. 1000 milliGRAM(s) IV Intermittent every 6 hours  HYDROmorphone  Injectable 0.5 milliGRAM(s) IV Push every 4 hours PRN  ibuprofen IVPB .. 800 milliGRAM(s) IV Intermittent every 6 hours PRN  ondansetron Injectable 4 milliGRAM(s) IV Push every 6 hours  oxyCODONE    IR 5 milliGRAM(s) Oral every 4 hours PRN        hyoscyamine SL 0.125 milliGRAM(s) SubLingual every 6 hours PRN  pantoprazole  Injectable 40 milliGRAM(s) IV Push daily  simethicone 80 milliGRAM(s) Chew every 8 hours PRN      dextrose 50% Injectable 25 Gram(s) IV Push once  dextrose 50% Injectable 12.5 Gram(s) IV Push once  dextrose 50% Injectable 25 Gram(s) IV Push once  dextrose 50% Injectable 25 Gram(s) IV Push once  dextrose 50% Injectable 12.5 Gram(s) IV Push once  dextrose 50% Injectable 25 Gram(s) IV Push once  dextrose Oral Gel 15 Gram(s) Oral once  dextrose Oral Gel 15 Gram(s) Oral once PRN  glucagon  Injectable 1 milliGRAM(s) IntraMuscular once  glucagon  Injectable 1 milliGRAM(s) IntraMuscular once  insulin lispro (ADMELOG) corrective regimen sliding scale   SubCutaneous three times a day before meals  insulin lispro (ADMELOG) corrective regimen sliding scale   SubCutaneous at bedtime    dextrose 5%. 1000 milliLiter(s) IV Continuous <Continuous>  dextrose 5%. 1000 milliLiter(s) IV Continuous <Continuous>  lactated ringers. 1000 milliLiter(s) IV Continuous <Continuous>  sodium chloride 0.9%. 2000 milliLiter(s) IV Continuous <Continuous>    influenza   Vaccine 0.5 milliLiter(s) IntraMuscular once              ICU Vital Signs Last 24 Hrs  T(C): 36.6 (27 Nov 2023 17:06), Max: 36.7 (27 Nov 2023 14:05)  T(F): 97.8 (27 Nov 2023 17:06), Max: 98.1 (27 Nov 2023 14:05)  HR: 84 (27 Nov 2023 17:06) (72 - 103)  BP: 107/71 (27 Nov 2023 17:06) (101/53 - 159/88)  BP(mean): --  ABP: --  ABP(mean): --  RR: 18 (27 Nov 2023 17:06) (10 - 24)  SpO2: 95% (27 Nov 2023 17:06) (87% - 100%)    O2 Parameters below as of 27 Nov 2023 15:05  Patient On (Oxygen Delivery Method): nasal cannula  O2 Flow (L/min): 4        Vital Signs Last 24 Hrs  T(C): 36.6 (27 Nov 2023 17:06), Max: 36.7 (27 Nov 2023 14:05)  T(F): 97.8 (27 Nov 2023 17:06), Max: 98.1 (27 Nov 2023 14:05)  HR: 84 (27 Nov 2023 17:06) (72 - 103)  BP: 107/71 (27 Nov 2023 17:06) (101/53 - 159/88)  BP(mean): --  RR: 18 (27 Nov 2023 17:06) (10 - 24)  SpO2: 95% (27 Nov 2023 17:06) (87% - 100%)    Parameters below as of 27 Nov 2023 15:05  Patient On (Oxygen Delivery Method): nasal cannula  O2 Flow (L/min): 4          I&O's Detail    27 Nov 2023 07:01  -  27 Nov 2023 18:02  --------------------------------------------------------  IN:    IV PiggyBack: 200 mL    Lactated Ringers: 1950 mL  Total IN: 2150 mL    OUT:    Blood Loss (mL): 15 mL    Voided (mL): 600 mL  Total OUT: 615 mL    Total NET: 1535 mL            LABS:                CAPILLARY BLOOD GLUCOSE      POCT Blood Glucose.: 212 mg/dL (27 Nov 2023 15:08)        CULTURES:      Physical Examination:    General: No acute distress.  obese female lying comfortably in bed    HEENT: Pupils equal, reactive to light.  Symmetric.    PULM: Clear to auscultation bilaterally, no wheeze rhonchi rales no change percussion    CVS: Regular rate and rhythm, no murmurs, rubs, or gallops    ABD: Soft, nondistended, mildly tender, decreased  bowel sounds, no masses    EXT: No edema, nontender calves    SKIN: Warm and well perfused, no rashes noted.    NEURO: Alert, oriented, interactive, nonfocal    RADIOLOGY: ***    CRITICAL CARE TIME SPENT: ***  
Pre-Op Dx: Morbid obesity  Procedure: Laparoscopic sleeve gastrectomy with laparoscopic repair of hiatal hernia if indicated      Surgeon: David    HPI:   23y year old Female s/p Laparoscopic sleeve gastrectomy with laparoscopic repair of hiatal hernia.      POD #1 Patient is ambulating on the floor and utilizing incentive spirometry. She is tolerating clear liquid diet and urinating well. Minimal incisional discomfort. Denies any nausea or vomiting. Pt seen and examined at the bedside.     VITALS:  Vital Signs Last 24 Hrs  T(C): 36.9 (28 Nov 2023 07:44), Max: 36.9 (27 Nov 2023 23:46)  T(F): 98.4 (28 Nov 2023 07:44), Max: 98.4 (27 Nov 2023 23:46)  HR: 76 (28 Nov 2023 07:44) (68 - 103)  BP: 128/84 (28 Nov 2023 07:44) (101/53 - 159/88)  BP(mean): --  RR: 18 (28 Nov 2023 07:44) (10 - 24)  SpO2: 97% (28 Nov 2023 07:44) (87% - 100%)    Parameters below as of 28 Nov 2023 07:44  Patient On (Oxygen Delivery Method): room air        11-27 @ 07:01  -  11-28 @ 07:00  --------------------------------------------------------  IN: 3450 mL / OUT: 3115 mL / NET: 335 mL    11-28 @ 07:01  -  11-28 @ 09:15  --------------------------------------------------------  IN: 0 mL / OUT: 250 mL / NET: -250 mL        LABS:                        11.5   16.09 )-----------( 297      ( 28 Nov 2023 06:00 )             34.9     11-28    139  |  101  |  6<L>  ----------------------------<  163<H>  3.7   |  27  |  0.66    Ca    9.1      28 Nov 2023 06:00        CAPILLARY BLOOD GLUCOSE      POCT Blood Glucose.: 161 mg/dL (28 Nov 2023 07:48)  POCT Blood Glucose.: 195 mg/dL (27 Nov 2023 22:14)  POCT Blood Glucose.: 212 mg/dL (27 Nov 2023 15:08)  POCT Blood Glucose.: 141 mg/dL (27 Nov 2023 09:38)    Urinalysis Basic - ( 28 Nov 2023 06:00 )    Color: x / Appearance: x / SG: x / pH: x  Gluc: 163 mg/dL / Ketone: x  / Bili: x / Urobili: x   Blood: x / Protein: x / Nitrite: x   Leuk Esterase: x / RBC: x / WBC x   Sq Epi: x / Non Sq Epi: x / Bacteria: x            Physical Exam:  General: A/O x 3, NAD, resting comfortably in bed  Abdominal: soft, ND, nontender to palpation, incisions C/D/I  Extremities: no edema, no calf tenderness B/L

## 2023-11-28 NOTE — DISCHARGE NOTE NURSING/CASE MANAGEMENT/SOCIAL WORK - NSDCPEFALRISK_GEN_ALL_CORE
For information on Fall & Injury Prevention, visit: [email or web address] OR  [email or web address] OR  https://www.cdc.gov/steadi/patient.html

## 2023-11-28 NOTE — PROGRESS NOTE ADULT - PROBLEM SELECTOR PLAN 1
- Bariatric clears, encourage adequate hydration as tolerated.   - Pain control  - DVT ppx  - OOB and ambulate  - Incentive spirometry  - Dietitian consult  - Dispo: home today

## 2023-11-28 NOTE — PROGRESS NOTE ADULT - ASSESSMENT
Pt. stable postop gastric sleeve.  Hx SATISH on bipap 25/7.  Suggest incentive jose c, Ambulate.  FU pulmonary post discharge. 
23y year old Female POD#1 s/p Laparoscopic sleeve gastrectomy with laparoscopic repair of hiatal hernia.   Recovering well, no acute issues.   Tolerating clears without  issues.   Urine color light ckear yellow.   Afebrile, hemodynamically stable.   IS 2000cc.   Ambulating and pain well controlled.

## 2023-11-28 NOTE — CONSULT NOTE ADULT - SUBJECTIVE AND OBJECTIVE BOX
HPI:  24 yo female, pmh of htn, DM, SATISH, obesity, presenting for sleeve gastrectomy, today is tolerating clears, no nausea, vomiting, abdominal pain is fair controlled, denies dizziness LOC, headaches.     PAST MEDICAL & SURGICAL HISTORY:  Obesity      Hypertension      Borderline personality disorder      Sleep apnea, obstructive      Diabetes mellitus, type 2      Morbid obesity with BMI of 45.0-49.9, adult      Elevated blood pressure reading with diagnosis of hypertension      H/O adenoidectomy      H/O myringotomy  with tubes      S/P cholecystectomy          ANTIMICROBIAL:    CARDIOVASCULAR:  enalaprilat Injectable 2.5 milliGRAM(s) IV Push every 6 hours    PULMONARY:    NEUROLOGIC:  acetaminophen   IVPB .. 1000 milliGRAM(s) IV Intermittent every 6 hours PRN  HYDROmorphone  Injectable 0.5 milliGRAM(s) IV Push every 4 hours PRN  ibuprofen IVPB .. 800 milliGRAM(s) IV Intermittent every 6 hours PRN  ondansetron Injectable 4 milliGRAM(s) IV Push every 6 hours  oxyCODONE    IR 5 milliGRAM(s) Oral every 4 hours PRN    ONCOLOGIC:    HEMATOLOGIC:  enoxaparin Injectable 40 milliGRAM(s) SubCutaneous every 24 hours    GATROINTESTINAL:  hyoscyamine SL 0.125 milliGRAM(s) SubLingual every 6 hours PRN  pantoprazole  Injectable 40 milliGRAM(s) IV Push daily  simethicone 80 milliGRAM(s) Chew every 8 hours PRN     MEDS:    ENDO/METABOLIC:  dextrose 50% Injectable 25 Gram(s) IV Push once  dextrose 50% Injectable 12.5 Gram(s) IV Push once  dextrose 50% Injectable 25 Gram(s) IV Push once  dextrose 50% Injectable 25 Gram(s) IV Push once  dextrose 50% Injectable 12.5 Gram(s) IV Push once  dextrose 50% Injectable 25 Gram(s) IV Push once  dextrose Oral Gel 15 Gram(s) Oral once  dextrose Oral Gel 15 Gram(s) Oral once PRN  glucagon  Injectable 1 milliGRAM(s) IntraMuscular once  glucagon  Injectable 1 milliGRAM(s) IntraMuscular once  insulin lispro (ADMELOG) corrective regimen sliding scale   SubCutaneous three times a day before meals  insulin lispro (ADMELOG) corrective regimen sliding scale   SubCutaneous at bedtime    IV FLUID/NUTRITION:  dextrose 5%. 1000 milliLiter(s) IV Continuous <Continuous>  dextrose 5%. 1000 milliLiter(s) IV Continuous <Continuous>  lactated ringers. 1000 milliLiter(s) IV Continuous <Continuous>  sodium chloride 0.9%. 2000 milliLiter(s) IV Continuous <Continuous>    TOPICAL:    IMMUNOLOGIC & OTHER  influenza   Vaccine 0.5 milliLiter(s) IntraMuscular once        Allergies    No Known Allergies    Intolerances      PAST MEDICAL & SURGICAL HISTORY:  Obesity      Hypertension      Borderline personality disorder      Sleep apnea, obstructive      Diabetes mellitus, type 2      Morbid obesity with BMI of 45.0-49.9, adult      Elevated blood pressure reading with diagnosis of hypertension      H/O adenoidectomy      H/O myringotomy  with tubes      S/P cholecystectomy        SOCIAL HISTORY:    FAMILY HISTORY:  Family history of diabetes mellitus (Mother)        Vital Signs Last 24 Hrs  T(C): 36.9 (28 Nov 2023 07:44), Max: 36.9 (27 Nov 2023 23:46)  T(F): 98.4 (28 Nov 2023 07:44), Max: 98.4 (27 Nov 2023 23:46)  HR: 76 (28 Nov 2023 07:44) (68 - 103)  BP: 128/84 (28 Nov 2023 07:44) (101/53 - 159/88)  BP(mean): --  RR: 18 (28 Nov 2023 07:44) (10 - 24)  SpO2: 97% (28 Nov 2023 07:44) (87% - 100%)    Parameters below as of 28 Nov 2023 07:44  Patient On (Oxygen Delivery Method): room air          I&O's Detail    27 Nov 2023 07:01  -  28 Nov 2023 07:00  --------------------------------------------------------  IN:    IV PiggyBack: 450 mL    Lactated Ringers: 1950 mL    Lactated Ringers: 1050 mL  Total IN: 3450 mL    OUT:    Blood Loss (mL): 15 mL    Voided (mL): 3100 mL  Total OUT: 3115 mL    Total NET: 335 mL      28 Nov 2023 07:01  -  28 Nov 2023 11:24  --------------------------------------------------------  IN:  Total IN: 0 mL    OUT:    Voided (mL): 250 mL  Total OUT: 250 mL    Total NET: -250 mL        Daily     Daily     REVIEW OF SYSTEMS:    as per hpi, other systems reviewed and are negative    PHYSICAL EXAM:    GENERAL: NAD, well-groomed, morbidly obese  HEAD:  Atraumatic, Normocephalic  EYES: EOMI, PERRLA, conjunctiva and sclera clear  ENMT: No tonsillar erythema, exudates, or enlargement; Moist mucous membranes, Good dentition, No lesions  NECK: Supple, No JVD, Normal thyroid  NERVOUS SYSTEM:  Alert & Oriented X3, Good concentration; no focal deficits   CHEST/LUNG: Clear to percussion bilaterally; No rales, rhonchi, wheezing, or rubs  HEART: Regular rate and rhythm; No murmurs, rubs, or gallops  ABDOMEN: Soft, appropriately tender in RUQ, obese, laproscopic sites c/d/i    EXTREMITIES:  2+ Peripheral Pulses, No clubbing, cyanosis   LYMPH: No lymphadenopathy   SKIN: No rashes or lesions      LABS:                        11.5   16.09 )-----------( 297      ( 28 Nov 2023 06:00 )             34.9     11-28    139  |  101  |  6<L>  ----------------------------<  163<H>  3.7   |  27  |  0.66    Ca    9.1      28 Nov 2023 06:00        Urinalysis Basic - ( 28 Nov 2023 06:00 )    Color: x / Appearance: x / SG: x / pH: x  Gluc: 163 mg/dL / Ketone: x  / Bili: x / Urobili: x   Blood: x / Protein: x / Nitrite: x   Leuk Esterase: x / RBC: x / WBC x   Sq Epi: x / Non Sq Epi: x / Bacteria: x              RADIOLOGY & ADDITIONAL STUDIES:
PULMONARY/CRITICAL CARE        Patient is a 23y old  Female who presents with a chief complaint of laparoscopic sleeve gastrectomy (27 Nov 2023 14:20)  Stable postop  BRIEF HOSPITAL COURSE: ***    Events last 24 hours: ***    PAST MEDICAL & SURGICAL HISTORY:  Obesity      Hypertension      Borderline personality disorder      Sleep apnea, obstructive      Diabetes mellitus, type 2      Morbid obesity with BMI of 45.0-49.9, adult      Elevated blood pressure reading with diagnosis of hypertension      H/O adenoidectomy      H/O myringotomy  with tubes      S/P cholecystectomy        Allergies    No Known Allergies    Intolerances      FAMILY HISTORY: unemployed no cigs, etoh  Family history of diabetes mellitus (Mother)        Review of Systems:  CONSTITUTIONAL: No fever, chills, or fatigue  EYES: No eye pain, visual disturbances, or discharge  ENMT:  No difficulty hearing, tinnitus, vertigo; No sinus or throat pain  NECK: No pain or stiffness  RESPIRATORY: No cough, wheezing, chills or hemoptysis; No shortness of breath  CARDIOVASCULAR: No chest pain, palpitations, dizziness, or leg swelling  GASTROINTESTINAL: mild  abdominal  pain. No nausea, vomiting, or hematemesis; No diarrhea or constipation. No melena or hematochezia.  GENITOURINARY: No dysuria, frequency, hematuria, or incontinence  NEUROLOGICAL: No headaches, memory loss, loss of strength, numbness, or tremors  SKIN: No itching, burning, rashes, or lesions   MUSCULOSKELETAL: No joint pain or swelling; No muscle, back, or extremity pain  PSYCHIATRIC: No depression, anxiety, mood swings, or difficulty sleeping      Medications:    enalaprilat Injectable 2.5 milliGRAM(s) IV Push every 6 hours      acetaminophen   IVPB .. 1000 milliGRAM(s) IV Intermittent every 6 hours  HYDROmorphone  Injectable 0.5 milliGRAM(s) IV Push every 4 hours PRN  ibuprofen IVPB .. 800 milliGRAM(s) IV Intermittent every 6 hours PRN  ondansetron Injectable 4 milliGRAM(s) IV Push every 6 hours  oxyCODONE    IR 5 milliGRAM(s) Oral every 4 hours PRN        hyoscyamine SL 0.125 milliGRAM(s) SubLingual every 6 hours PRN  pantoprazole  Injectable 40 milliGRAM(s) IV Push daily  simethicone 80 milliGRAM(s) Chew every 8 hours PRN      dextrose 50% Injectable 25 Gram(s) IV Push once  dextrose 50% Injectable 12.5 Gram(s) IV Push once  dextrose 50% Injectable 25 Gram(s) IV Push once  dextrose 50% Injectable 25 Gram(s) IV Push once  dextrose 50% Injectable 12.5 Gram(s) IV Push once  dextrose 50% Injectable 25 Gram(s) IV Push once  dextrose Oral Gel 15 Gram(s) Oral once  dextrose Oral Gel 15 Gram(s) Oral once PRN  glucagon  Injectable 1 milliGRAM(s) IntraMuscular once  glucagon  Injectable 1 milliGRAM(s) IntraMuscular once  insulin lispro (ADMELOG) corrective regimen sliding scale   SubCutaneous three times a day before meals  insulin lispro (ADMELOG) corrective regimen sliding scale   SubCutaneous at bedtime    dextrose 5%. 1000 milliLiter(s) IV Continuous <Continuous>  dextrose 5%. 1000 milliLiter(s) IV Continuous <Continuous>  lactated ringers. 1000 milliLiter(s) IV Continuous <Continuous>  sodium chloride 0.9%. 2000 milliLiter(s) IV Continuous <Continuous>    influenza   Vaccine 0.5 milliLiter(s) IntraMuscular once              ICU Vital Signs Last 24 Hrs  T(C): 36.6 (27 Nov 2023 17:06), Max: 36.7 (27 Nov 2023 14:05)  T(F): 97.8 (27 Nov 2023 17:06), Max: 98.1 (27 Nov 2023 14:05)  HR: 84 (27 Nov 2023 17:06) (72 - 103)  BP: 107/71 (27 Nov 2023 17:06) (101/53 - 159/88)  BP(mean): --  ABP: --  ABP(mean): --  RR: 18 (27 Nov 2023 17:06) (10 - 24)  SpO2: 95% (27 Nov 2023 17:06) (87% - 100%)    O2 Parameters below as of 27 Nov 2023 15:05  Patient On (Oxygen Delivery Method): nasal cannula  O2 Flow (L/min): 4        Vital Signs Last 24 Hrs  T(C): 36.6 (27 Nov 2023 17:06), Max: 36.7 (27 Nov 2023 14:05)  T(F): 97.8 (27 Nov 2023 17:06), Max: 98.1 (27 Nov 2023 14:05)  HR: 84 (27 Nov 2023 17:06) (72 - 103)  BP: 107/71 (27 Nov 2023 17:06) (101/53 - 159/88)  BP(mean): --  RR: 18 (27 Nov 2023 17:06) (10 - 24)  SpO2: 95% (27 Nov 2023 17:06) (87% - 100%)    Parameters below as of 27 Nov 2023 15:05  Patient On (Oxygen Delivery Method): nasal cannula  O2 Flow (L/min): 4          I&O's Detail    27 Nov 2023 07:01  -  27 Nov 2023 18:02  --------------------------------------------------------  IN:    IV PiggyBack: 200 mL    Lactated Ringers: 1950 mL  Total IN: 2150 mL    OUT:    Blood Loss (mL): 15 mL    Voided (mL): 600 mL  Total OUT: 615 mL    Total NET: 1535 mL            LABS:                CAPILLARY BLOOD GLUCOSE      POCT Blood Glucose.: 212 mg/dL (27 Nov 2023 15:08)        CULTURES:      Physical Examination:    General: No acute distress.  obese female lying comfortably in bed    HEENT: Pupils equal, reactive to light.  Symmetric.    PULM: Clear to auscultation bilaterally, no wheeze rhonchi rales no change percussion    CVS: Regular rate and rhythm, no murmurs, rubs, or gallops    ABD: Soft, nondistended, mildly tender, decreased  bowel sounds, no masses    EXT: No edema, nontender calves    SKIN: Warm and well perfused, no rashes noted.    NEURO: Alert, oriented, interactive, nonfocal    RADIOLOGY: ***    CRITICAL CARE TIME SPENT: ***

## 2023-11-28 NOTE — CARE COORDINATION ASSESSMENT. - OTHER PERTINENT DISCHARGE PLANNING INFORMATION:
Patient is a 23 year old female admitted from home now sp Laparoscopic sleeve gastrectomy with laparoscopic repair of hiatal hernia with EGD by Dr Hernandez. Patient lives with parents who were bedside. I explained SW name role availability. Patient having some pain, nurse made aware. Patient's parents will be home to assist her upon leaving hospital. +sbirt completed with patient in private. Plan for home. Her PCP Dr Aldridge [phone] and local pharmacy is [facility] [phone].

## 2023-11-29 RX ORDER — OMEPRAZOLE 10 MG/1
1 CAPSULE, DELAYED RELEASE ORAL
Qty: 0 | Refills: 0 | DISCHARGE
Start: 2023-11-29

## 2023-12-04 ENCOUNTER — NON-APPOINTMENT (OUTPATIENT)
Age: 23
End: 2023-12-04

## 2023-12-04 RX ORDER — MULTIVIT-MIN/FERROUS GLUCONATE 9 MG/15 ML
1 LIQUID (ML) ORAL
Qty: 0 | Refills: 0 | DISCHARGE
Start: 2023-12-04

## 2023-12-05 ENCOUNTER — APPOINTMENT (OUTPATIENT)
Dept: BARIATRICS | Facility: CLINIC | Age: 23
End: 2023-12-05
Payer: COMMERCIAL

## 2023-12-05 ENCOUNTER — NON-APPOINTMENT (OUTPATIENT)
Age: 23
End: 2023-12-05

## 2023-12-05 VITALS
DIASTOLIC BLOOD PRESSURE: 80 MMHG | SYSTOLIC BLOOD PRESSURE: 120 MMHG | HEIGHT: 65 IN | WEIGHT: 282.19 LBS | OXYGEN SATURATION: 99 % | BODY MASS INDEX: 47.02 KG/M2 | HEART RATE: 83 BPM

## 2023-12-05 PROCEDURE — 99024 POSTOP FOLLOW-UP VISIT: CPT

## 2023-12-27 ENCOUNTER — APPOINTMENT (OUTPATIENT)
Dept: BARIATRICS | Facility: CLINIC | Age: 23
End: 2023-12-27
Payer: COMMERCIAL

## 2023-12-27 VITALS
TEMPERATURE: 96.6 F | WEIGHT: 269.18 LBS | BODY MASS INDEX: 44.85 KG/M2 | HEIGHT: 65 IN | OXYGEN SATURATION: 98 % | SYSTOLIC BLOOD PRESSURE: 120 MMHG | DIASTOLIC BLOOD PRESSURE: 76 MMHG | HEART RATE: 82 BPM

## 2023-12-27 DIAGNOSIS — G47.33 OBSTRUCTIVE SLEEP APNEA (ADULT) (PEDIATRIC): ICD-10-CM

## 2023-12-27 PROBLEM — I10 ESSENTIAL (PRIMARY) HYPERTENSION: Chronic | Status: ACTIVE | Noted: 2023-11-15

## 2023-12-27 PROBLEM — E11.9 TYPE 2 DIABETES MELLITUS WITHOUT COMPLICATIONS: Chronic | Status: ACTIVE | Noted: 2023-11-15

## 2023-12-27 PROBLEM — E66.01 MORBID (SEVERE) OBESITY DUE TO EXCESS CALORIES: Chronic | Status: ACTIVE | Noted: 2023-11-15

## 2023-12-27 PROCEDURE — 99024 POSTOP FOLLOW-UP VISIT: CPT

## 2023-12-27 RX ORDER — TIRZEPATIDE 7.5 MG/.5ML
7.5 INJECTION, SOLUTION SUBCUTANEOUS
Refills: 0 | Status: DISCONTINUED | COMMUNITY
End: 2023-12-27

## 2023-12-27 RX ORDER — ONDANSETRON 4 MG/1
4 TABLET, ORALLY DISINTEGRATING ORAL EVERY 8 HOURS
Qty: 15 | Refills: 1 | Status: DISCONTINUED | COMMUNITY
Start: 2023-11-15 | End: 2023-12-27

## 2023-12-27 RX ORDER — OXYCODONE 5 MG/1
5 TABLET ORAL
Qty: 6 | Refills: 0 | Status: DISCONTINUED | COMMUNITY
Start: 2023-11-15 | End: 2023-12-27

## 2023-12-28 PROBLEM — G47.33 OSA ON CPAP: Status: ACTIVE | Noted: 2023-08-15

## 2023-12-28 NOTE — ASSESSMENT
[FreeTextEntry1] : 23 year old F is 1 month s/p Laparoscopic Sleeve Gastrectomy and HH repair. Weight loss 13 lbs since last visit. Doing well overall.  Nutrition and exercise guidelines were reviewed with the patient and her father.   Continue 3 meals/day, pureed/soft low fat, low carbohydrate and high protein diet until 6 weeks postop, then progress to regular textured foods as tolerated Maintain daily food log- follow up with nutritionist 1/9/24 Daily zero calorie liquid intake 64 oz per day Continue daily Bariatric Fusion vitamins as directed- we reviewed other options for bariatric vitamins. Handout given to patient.  May start multivitamin capsules with additional calcium citrate May take pills at this point Increase ambulation and start strength exercises 6 weeks post op as tolerated Discontinue omeprazole   Return to the office in 1 months Follow up with PCP for continuity of care All questions answered   Pt seen with Dr Hernandez

## 2023-12-28 NOTE — PHYSICAL EXAM
[Obese, well nourished, in no acute distress] : obese, well nourished, in no acute distress [Normal] : affect appropriate [de-identified] : Equal chest rise, non-labored respirations, no audible wheezing. [de-identified] : soft, NT, ND, surgical incisions healing well. [de-identified] : ZAKIA

## 2023-12-28 NOTE — REASON FOR VISIT
[Follow-Up Visit] : a follow-up visit for [S/P Bariatric Surgery] : s/p bariatric surgery [Parent] : parent

## 2023-12-28 NOTE — HISTORY OF PRESENT ILLNESS
[Procedure: ___] : Procedure performed: [unfilled]  [Date of Surgery: ___] : Date of Surgery:   [unfilled] [Surgeon Name:   ___] : Surgeon Name: Dr. BARNHART [Pre-Op Weight ___] : Pre-op weight was [unfilled] lbs [de-identified] : 23 year old F is 1 month s/p Laparoscopic Sleeve Gastrectomy and HH repair. Weight loss 13 lbs since last visit. Tolerating soft foods well. Pt is trying to consume 3 meals/day with adequate protein intake. No reflux, no longer taking omeprazole. Drinking adequate zero calorie liquid, 60 oz/day. Taking Bariatric vitamins daily, but is inquiring about different types because she does not like the chewables. States she is not exercising, just ambulating for ADLs. Sleeping 10 hours/night. No abdominal pain, reflux, nausea, vomiting, constipation or diarrhea.

## 2024-01-09 ENCOUNTER — APPOINTMENT (OUTPATIENT)
Dept: BARIATRICS/WEIGHT MGMT | Facility: CLINIC | Age: 24
End: 2024-01-09
Payer: COMMERCIAL

## 2024-01-09 VITALS — HEIGHT: 65 IN | WEIGHT: 260 LBS | BODY MASS INDEX: 43.32 KG/M2

## 2024-01-09 PROCEDURE — 97803 MED NUTRITION INDIV SUBSEQ: CPT

## 2024-01-17 ENCOUNTER — RX RENEWAL (OUTPATIENT)
Age: 24
End: 2024-01-17

## 2024-01-26 NOTE — ED ADULT NURSE NOTE - CADM POA CENTRAL LINE
Brief Postoperative Note      Patient: Katherine Mondragon  YOB: 1983  MRN: [id number]    Date of Procedure: 1/26/2024    Pre-Op Diagnosis Codes:     * Spondylolisthesis of lumbar region [M43.16]     * Spinal stenosis, lumbar region, with neurogenic claudication [M48.062]    Post-Op Diagnosis: Same       Procedure(s):  REVISION LAMINECTOMY L3-4, L4-5 WITH POSTERIOR LUMBAR INTERBODY FUSION (MASSIMO, O-ARM) (E R A S)    Surgeon(s):  Clarence Sanderson MD    Assistant:  Physician Assistant: Wing Urbina PA-C    Anesthesia: General    Estimated Blood Loss (mL): less than 50     Complications: None    Specimens:   * No specimens in log *    Implants:  Implant Name Type Inv. Item Serial No.  Lot No. LRB No. Used Action   GRAFT BNE FIBER 15 CC OSTEOAMP LULA - [id number]  GRAFT BNE FIBER 15 CC OSTEOAMP LULA [id number] [employer]WD N/A N/A 1 Implanted   GRAFT BONE 10 CC - [id number]  GRAFT BONE 10 CC [id number] [employer]WD N/A N/A 1 Implanted   GRAFT BNE INJ 6 CC DEMINERALIZED FIBER GR - [id number]  GRAFT BNE INJ 6 CC DEMINERALIZED FIBER GR [id number] [employer] SPINALGRAFT TECH-WD N/A N/A 1 Implanted   SCREW SET MULTAXL STRL CD HORZ MODULEX - SNA  SCREW SET MULTAXL STRL CD HORZ MODULEX NA [employer] SOFAMOR DANEK-WD [id number] N/A 4 Implanted   SPACER SPNL SHT 7 MM CATALYFT PL - SN/A  SPACER SPNL SHT 7 MM CATALYFT PL N/A [employer] SOFAMOR DANEK-WD [id number] N/A 1 Implanted   SPACER SPNL SHT 7 MM CATALYFT PL - SN/A  SPACER SPNL SHT 7 MM CATALYFT PL N/A [employer] SOFAMOR DANEK-WD [id number] N/A 1 Implanted   SCREW SPNL THRD 6.5X50 MM SHANK CD HORZ MODULEX ATS - SN/A  SCREW SPNL THRD 6.5X50 MM SHANK CD HORZ MODULEX ATS N/A [employer] SOFAMOR DANEK-WD N/A N/A 4 Implanted   KEITH SPNL L40MM DIA5.5MM ANT POST THORACOLUMBOSACRAL TI - SN/A  KEITH SPNL L40MM DIA5.5MM ANT POST THORACOLUMBOSACRAL TI N/A [employer] SOFWALLACE BIRCH-WD N/A N/A 2 Implanted         Drains:   Closed/Suction Drain Right Back Accordion  (Active)       Findings: Stenosis.       Electronically signed by Wing Urbina PA-C on 1/26/2024 at 10:53 AM   No

## 2024-01-30 ENCOUNTER — APPOINTMENT (OUTPATIENT)
Dept: BARIATRICS | Facility: CLINIC | Age: 24
End: 2024-01-30
Payer: COMMERCIAL

## 2024-01-30 VITALS
SYSTOLIC BLOOD PRESSURE: 130 MMHG | HEART RATE: 69 BPM | BODY MASS INDEX: 45.18 KG/M2 | HEIGHT: 65 IN | WEIGHT: 271.16 LBS | OXYGEN SATURATION: 97 % | TEMPERATURE: 96.7 F | DIASTOLIC BLOOD PRESSURE: 90 MMHG

## 2024-01-30 PROCEDURE — 99024 POSTOP FOLLOW-UP VISIT: CPT

## 2024-01-30 NOTE — ASSESSMENT
[FreeTextEntry1] : 23 year old woman 2 months s/p Laparoscopic Sleeve Gastrectomy and hiatal hernia repair presents for follow-up. 2 lb weight gain since last visit. Patient doing well. Nutrition and exercise guidelines reviewed with the patient.   Continue 3 protein-focused meals/day (aim for 60 g - 70 g protein/day); limit cheese and starch; try to avoid sipping with meals Encourage zero calorie fluid intake (64 oz/day) Continue bariatric vitamins Exercise with cardio (aim for 8k-10k steps/day) and start strength training 2-3x/week Use step counter Weigh yourself 1x-2x/week Try the Calm mati or Soothing Pod mati for stress management Follow-up with nutritionist (keep a food log) Follow-up in one month All questions answered   Call with any questions or concerns   Time before and after visit spent reviewing chart

## 2024-01-30 NOTE — PHYSICAL EXAM
[Normal] : PERRL, EOMI, no conjunctival infection, anicteric [de-identified] : soft, NT, ND, no evidence of hernia or diastasis, incisions healing well without erythema or tenderness; suture removed from RLQ incision site at today's visit

## 2024-01-30 NOTE — HISTORY OF PRESENT ILLNESS
[Procedure: ___] : Procedure performed: [unfilled]  [Date of Surgery: ___] : Date of Surgery:   [unfilled] [Surgeon Name:   ___] : Surgeon Name: Dr. BARNHART [Pre-Op Weight ___] : Pre-op weight was [unfilled] lbs [de-identified] : 23 year old woman 2 months s/p Laparoscopic Sleeve Gastrectomy and hiatal hernia repair presents for follow-up. 2 lb weight gain since last visit. Reports no abdominal pain, nausea/vomiting, constipation, diarrhea, reflux/heartburn. Patient eating 3 protein-rich meals/day, drinking adequate zero-calorie fluids/day, taking bariatric vitamins, and walking. Reports occasionally smoking cigarettes; discussed the importance of smoking cessation with the pt.

## 2024-02-20 ENCOUNTER — APPOINTMENT (OUTPATIENT)
Dept: BARIATRICS/WEIGHT MGMT | Facility: CLINIC | Age: 24
End: 2024-02-20
Payer: COMMERCIAL

## 2024-02-20 VITALS — HEIGHT: 65 IN | BODY MASS INDEX: 42.49 KG/M2 | WEIGHT: 255 LBS

## 2024-02-20 DIAGNOSIS — E66.9 OBESITY, UNSPECIFIED: ICD-10-CM

## 2024-02-20 PROCEDURE — 97803 MED NUTRITION INDIV SUBSEQ: CPT | Mod: GT,95

## 2024-02-26 ENCOUNTER — APPOINTMENT (OUTPATIENT)
Dept: CARDIOLOGY | Facility: CLINIC | Age: 24
End: 2024-02-26
Payer: COMMERCIAL

## 2024-02-26 ENCOUNTER — NON-APPOINTMENT (OUTPATIENT)
Age: 24
End: 2024-02-26

## 2024-02-26 VITALS
BODY MASS INDEX: 44.48 KG/M2 | HEIGHT: 65 IN | SYSTOLIC BLOOD PRESSURE: 169 MMHG | OXYGEN SATURATION: 96 % | HEART RATE: 65 BPM | WEIGHT: 267 LBS | DIASTOLIC BLOOD PRESSURE: 132 MMHG

## 2024-02-26 VITALS — SYSTOLIC BLOOD PRESSURE: 158 MMHG | DIASTOLIC BLOOD PRESSURE: 114 MMHG

## 2024-02-26 DIAGNOSIS — E66.01 MORBID (SEVERE) OBESITY DUE TO EXCESS CALORIES: ICD-10-CM

## 2024-02-26 PROCEDURE — 99213 OFFICE O/P EST LOW 20 MIN: CPT | Mod: 25

## 2024-02-26 PROCEDURE — 99214 OFFICE O/P EST MOD 30 MIN: CPT

## 2024-02-26 PROCEDURE — 93000 ELECTROCARDIOGRAM COMPLETE: CPT

## 2024-02-26 RX ORDER — CLONAZEPAM 0.5 MG/1
0.5 TABLET ORAL TWICE DAILY
Qty: 60 | Refills: 0 | Status: ACTIVE | COMMUNITY
Start: 2020-01-04

## 2024-02-26 RX ORDER — AMLODIPINE BESYLATE 10 MG/1
10 TABLET ORAL
Qty: 90 | Refills: 3 | Status: ACTIVE | COMMUNITY
Start: 2021-12-09 | End: 1900-01-01

## 2024-02-26 NOTE — HISTORY OF PRESENT ILLNESS
[FreeTextEntry1] : Sari is a 23yoF PMH PCOS, morbid obesity, HTN, DM (a1c 6.7%), who presents for follow up  She was initially seen in August of 2023 for pre-op evaluation prior to bariatric surgery. She was continued on her GLP-1 agonist and her Amlodipine at that time.  She is now s/p laparoscopic gastric sleep and hiatal hernia repair on 11/27/23  Has started walking more and got a gym membership

## 2024-02-26 NOTE — CARDIOLOGY SUMMARY
[TextEntry] :  EK23: NSR, no ST changes EKG 24:  TTE 2023: CONCLUSIONS: 1. Technically difficult image quality. 2. The left ventricular cavity is moderately dilated size. Left ventricular systolic function is normal with an ejection fraction of 69 % by Miranda's method of disks. There are no regional wall motion abnormalities seen. 3. Normal left ventricular mass and size. 4. The right ventricle is not well visualized; appears grossly normal in size and function. 5. Structurally normal mitral valve with normal leaflet excursion. 6. Trace mitral regurgitation. 7. Aortic valve anatomy cannot be determined with normal systolic excursion. 8. No prior echocardiogram is available for comparison.  EKG stress 2023: Conclusions: 1. The ECG is negative for ischemia. 2. Patient achieved 7.3 METS, which is consistent with poor exercise capacity. 3. Exaggerated heart rate response. 4. Hypertensive blood pressure response. 5. The Duke Treadmill Score is 6.00, which is consistent with low risk (=5) for future cardiac events.

## 2024-02-26 NOTE — PHYSICAL EXAM
[Well Developed] : well developed [Well Nourished] : well nourished [No Acute Distress] : no acute distress [Normal Conjunctiva] : normal conjunctiva [Normal Venous Pressure] : normal venous pressure [No Carotid Bruit] : no carotid bruit [Normal S1, S2] : normal S1, S2 [No Murmur] : no murmur [Clear Lung Fields] : clear lung fields [No Gallop] : no gallop [No Rub] : no rub [No Respiratory Distress] : no respiratory distress  [Good Air Entry] : good air entry [Soft] : abdomen soft [Non Tender] : non-tender [Normal Bowel Sounds] : normal bowel sounds [No Masses/organomegaly] : no masses/organomegaly [No Edema] : no edema [Normal Gait] : normal gait [No Cyanosis] : no cyanosis [No Clubbing] : no clubbing [No Skin Lesions] : no skin lesions [No Rash] : no rash [No Varicosities] : no varicosities [Moves all extremities] : moves all extremities [No Focal Deficits] : no focal deficits [Normal Speech] : normal speech [Alert and Oriented] : alert and oriented [Normal memory] : normal memory

## 2024-02-26 NOTE — DISCUSSION/SUMMARY
[EKG obtained to assist in diagnosis and management of assessed problem(s)] : EKG obtained to assist in diagnosis and management of assessed problem(s) [FreeTextEntry1] : Sari is a 23yoF PMH PCOS, morbid obesity, HTN, DM (a1c 6.7%), who presents for follow up  She has upcoming blood work, will follow up her repeat a1c and lipid panel.  She has stopped taking her Amlodipine since after her surgery but her BP remains elevated today. She will keep a BP log at home and return in 1 week for a BP check with the RNs. On review of her recent follow up appts, her BPs have all been controlled off of her Amlodipine.   To follow up in 3 months.

## 2024-03-04 ENCOUNTER — APPOINTMENT (OUTPATIENT)
Dept: CARDIOLOGY | Facility: CLINIC | Age: 24
End: 2024-03-04
Payer: COMMERCIAL

## 2024-03-04 VITALS
DIASTOLIC BLOOD PRESSURE: 90 MMHG | HEART RATE: 90 BPM | BODY MASS INDEX: 44.48 KG/M2 | WEIGHT: 267 LBS | HEIGHT: 65 IN | SYSTOLIC BLOOD PRESSURE: 150 MMHG

## 2024-03-04 PROCEDURE — 99211 OFF/OP EST MAY X REQ PHY/QHP: CPT

## 2024-03-06 ENCOUNTER — APPOINTMENT (OUTPATIENT)
Dept: BARIATRICS | Facility: CLINIC | Age: 24
End: 2024-03-06
Payer: COMMERCIAL

## 2024-03-06 VITALS
OXYGEN SATURATION: 97 % | HEART RATE: 84 BPM | HEIGHT: 65 IN | SYSTOLIC BLOOD PRESSURE: 140 MMHG | BODY MASS INDEX: 44.44 KG/M2 | DIASTOLIC BLOOD PRESSURE: 80 MMHG | WEIGHT: 266.75 LBS

## 2024-03-06 PROCEDURE — 99214 OFFICE O/P EST MOD 30 MIN: CPT

## 2024-03-06 RX ORDER — METFORMIN ER 500 MG 500 MG/1
500 TABLET ORAL DAILY
Refills: 0 | Status: DISCONTINUED | COMMUNITY
End: 2024-03-06

## 2024-03-07 RX ORDER — MULTIVIT-MIN/IRON/FOLIC ACID/K 45-800-120
CAPSULE ORAL
Refills: 0 | Status: ACTIVE | COMMUNITY

## 2024-03-07 RX ORDER — CALCIUM CARBONATE/VITAMIN D3 500 MG-2.5
TABLET,CHEWABLE ORAL
Refills: 0 | Status: ACTIVE | COMMUNITY

## 2024-03-07 NOTE — ASSESSMENT
[FreeTextEntry1] : 23-year-old woman over 3 months s/p Laparoscopic Sleeve Gastrectomy with hiatal hernia repair presents for follow-up. 5 lb weight loss since last visit. Nutrition and exercise guidelines reviewed with the patient.   Continue 3 protein-focused meals/day (aim for 60 g - 70 g protein/day) Encourage zero calorie fluid intake (64 oz/day) Continue bariatric vitamins Increase exercise with cardio (aim for 8k-10k steps/day) and start strength training 2-3x/week Use step counter Weigh yourself 1x-2x/week Try the Calm mati or Soothing Pod mati for stress management Follow-up with nutritionist (keep a food log) Follow-up in 3 months All questions answered   Call with any questions or concerns   Time before and after visit spent reviewing chart

## 2024-03-07 NOTE — ED PEDIATRIC NURSE NOTE - QUALITY
Pt. on CPAP with documented settings. Pt appears to be in no respiratory distress. Will continue to monitor     pressure

## 2024-03-07 NOTE — HISTORY OF PRESENT ILLNESS
[Procedure: ___] : Procedure performed: [unfilled]  [Date of Surgery: ___] : Date of Surgery:   [unfilled] [Surgeon Name:   ___] : Surgeon Name: Dr. BARNHART [Pre-Op Weight ___] : Pre-op weight was [unfilled] lbs [de-identified] : 23-year-old woman over 3 months s/p Laparoscopic Sleeve Gastrectomy with hiatal hernia repair presents for follow-up. 5 lb weight loss since last visit. Reports no abdominal pain, nausea/vomiting, constipation, diarrhea, reflux/heartburn. Patient eating 3 protein-rich meals/day (occasionally snacks on fruit or vegetables), drinking adequate zero-calorie fluids/day, taking bariatric vitamins, and walking 3 days/week.  Patient recently restarted psych meds.  Patient saw nutritionist February 20, 2024.  Last nutritionist appt was 2/20/24

## 2024-03-07 NOTE — PHYSICAL EXAM
[Normal] : affect appropriate [de-identified] : soft, NT, ND, no evidence of hernia or diastasis, incisions well-healed

## 2024-04-17 ENCOUNTER — NON-APPOINTMENT (OUTPATIENT)
Age: 24
End: 2024-04-17

## 2024-04-18 ENCOUNTER — APPOINTMENT (OUTPATIENT)
Dept: INTERNAL MEDICINE | Facility: CLINIC | Age: 24
End: 2024-04-18

## 2024-04-19 DIAGNOSIS — L65.9 NONSCARRING HAIR LOSS, UNSPECIFIED: ICD-10-CM

## 2024-04-19 DIAGNOSIS — K91.2 POSTSURGICAL MALABSORPTION, NOT ELSEWHERE CLASSIFIED: ICD-10-CM

## 2024-04-19 DIAGNOSIS — Z90.3 POSTSURGICAL MALABSORPTION, NOT ELSEWHERE CLASSIFIED: ICD-10-CM

## 2024-05-06 ENCOUNTER — OFFICE (OUTPATIENT)
Dept: URBAN - METROPOLITAN AREA CLINIC 109 | Facility: CLINIC | Age: 24
Setting detail: OPHTHALMOLOGY
End: 2024-05-06
Payer: COMMERCIAL

## 2024-05-06 DIAGNOSIS — H40.053: ICD-10-CM

## 2024-05-06 DIAGNOSIS — H02.534: ICD-10-CM

## 2024-05-06 DIAGNOSIS — H02.531: ICD-10-CM

## 2024-05-06 PROCEDURE — 99213 OFFICE O/P EST LOW 20 MIN: CPT | Performed by: OPHTHALMOLOGY

## 2024-05-06 PROCEDURE — 92133 CPTRZD OPH DX IMG PST SGM ON: CPT | Performed by: OPHTHALMOLOGY

## 2024-05-06 ASSESSMENT — CONFRONTATIONAL VISUAL FIELD TEST (CVF)
OS_FINDINGS: FULL
OD_FINDINGS: FULL

## 2024-05-09 DIAGNOSIS — Z13.21 ENCOUNTER FOR SCREENING FOR NUTRITIONAL DISORDER: ICD-10-CM

## 2024-05-16 ENCOUNTER — APPOINTMENT (OUTPATIENT)
Dept: BARIATRICS/WEIGHT MGMT | Facility: CLINIC | Age: 24
End: 2024-05-16
Payer: COMMERCIAL

## 2024-05-16 VITALS — HEIGHT: 65 IN | BODY MASS INDEX: 41.65 KG/M2 | WEIGHT: 250 LBS

## 2024-05-16 DIAGNOSIS — E66.01 MORBID (SEVERE) OBESITY DUE TO EXCESS CALORIES: ICD-10-CM

## 2024-05-16 PROCEDURE — 97803 MED NUTRITION INDIV SUBSEQ: CPT | Mod: 95

## 2024-05-18 NOTE — ED ADULT NURSE NOTE - NSIMPLEMENTINTERV_GEN_ALL_ED
Attending and PA/NP shared services statement (NON-critical care): Implemented All Universal Safety Interventions:  [place] to call system. Call bell, personal items and telephone within reach. Instruct patient to call for assistance. Room bathroom lighting operational. Non-slip footwear when patient is off stretcher. Physically safe environment: no spills, clutter or unnecessary equipment. Stretcher in lowest position, wheels locked, appropriate side rails in place.

## 2024-05-28 ENCOUNTER — APPOINTMENT (OUTPATIENT)
Dept: CARDIOLOGY | Facility: CLINIC | Age: 24
End: 2024-05-28
Payer: COMMERCIAL

## 2024-05-28 ENCOUNTER — NON-APPOINTMENT (OUTPATIENT)
Age: 24
End: 2024-05-28

## 2024-05-28 VITALS
HEART RATE: 88 BPM | OXYGEN SATURATION: 96 % | HEIGHT: 65 IN | DIASTOLIC BLOOD PRESSURE: 83 MMHG | BODY MASS INDEX: 41.65 KG/M2 | WEIGHT: 250 LBS | SYSTOLIC BLOOD PRESSURE: 137 MMHG

## 2024-05-28 DIAGNOSIS — E11.9 TYPE 2 DIABETES MELLITUS W/OUT COMPLICATIONS: ICD-10-CM

## 2024-05-28 DIAGNOSIS — I10 ESSENTIAL (PRIMARY) HYPERTENSION: ICD-10-CM

## 2024-05-28 PROCEDURE — 99214 OFFICE O/P EST MOD 30 MIN: CPT | Mod: 25

## 2024-05-28 PROCEDURE — G2211 COMPLEX E/M VISIT ADD ON: CPT | Mod: NC

## 2024-05-28 PROCEDURE — 93000 ELECTROCARDIOGRAM COMPLETE: CPT

## 2024-05-28 RX ORDER — SPIRONOLACTONE 50 MG/1
TABLET, FILM COATED ORAL
Refills: 0 | Status: ACTIVE | COMMUNITY

## 2024-05-28 NOTE — HISTORY OF PRESENT ILLNESS
[FreeTextEntry1] : Sari is a 24yoF PMH PCOS, morbid obesity, HTN, DM (a1c 6.7%), who presents for follow up  She was last seen in 2/2024 for follow up. At that visit, she had stopped taking her Amlodipine but her BP had been elevated that day. She was to return with a BP log. A BP check one week later showed persistently elevated BP at 150/90. She was instructed to resume her 10mg of Amlodipine.  She is now s/p laparoscopic gastric sleep and hiatal hernia repair on 11/27/23 Needs repeat blood work of lipids and a1c  She has been seen by Endo and notes that her a1c came down significantly but was recommended to remain on Metformin and to start Mounjaro (on back order)  She has been taking walks.  She has no symptoms of chest pain or SOB and feels like she can do a lot more.

## 2024-05-28 NOTE — CARDIOLOGY SUMMARY
[TextEntry] : EK23: NSR, no ST changes EKG 24: SR  TTE 2023: CONCLUSIONS: 1. Technically difficult image quality. 2. The left ventricular cavity is moderately dilated size. Left ventricular systolic function is normal with an ejection fraction of 69 % by Miranda's method of disks. There are no regional wall motion abnormalities seen. 3. Normal left ventricular mass and size. 4. The right ventricle is not well visualized; appears grossly normal in size and function. 5. Structurally normal mitral valve with normal leaflet excursion. 6. Trace mitral regurgitation. 7. Aortic valve anatomy cannot be determined with normal systolic excursion. 8. No prior echocardiogram is available for comparison.  EKG stress 2023: Conclusions: 1. The ECG is negative for ischemia. 2. Patient achieved 7.3 METS, which is consistent with poor exercise capacity. 3. Exaggerated heart rate response. 4. Hypertensive blood pressure response. 5. The Duke Treadmill Score is 6.00, which is consistent with low risk (=5) for future cardiac events.

## 2024-05-28 NOTE — DISCUSSION/SUMMARY
[FreeTextEntry1] : Sari is a 24yoF PMH PCOS, morbid obesity, HTN, DM (a1c 6.7%), who presents for follow up  She is doing well from a cardiac standpoint. She denies any symptoms at this time. She is compliant with her 10mg of Amlodipine and her BP is well controlled today. She has also been initiated on Aldactone by Dermatology.  She has had recent blood work done by an outside physician. She will bring those results to our next appt.   She remains on Metformin for her DM. Mounjaro has been ordered by Endo as well. Diet and exercise was strongly encouraged.   She will follow up in 6-12 months.  [EKG obtained to assist in diagnosis and management of assessed problem(s)] : EKG obtained to assist in diagnosis and management of assessed problem(s)

## 2024-06-18 ENCOUNTER — APPOINTMENT (OUTPATIENT)
Dept: BARIATRICS | Facility: CLINIC | Age: 24
End: 2024-06-18
Payer: COMMERCIAL

## 2024-06-18 VITALS
SYSTOLIC BLOOD PRESSURE: 122 MMHG | TEMPERATURE: 96.6 F | DIASTOLIC BLOOD PRESSURE: 78 MMHG | BODY MASS INDEX: 42.09 KG/M2 | HEIGHT: 65 IN | OXYGEN SATURATION: 99 % | WEIGHT: 252.65 LBS | HEART RATE: 77 BPM

## 2024-06-18 DIAGNOSIS — Z98.890 OTHER SPECIFIED POSTPROCEDURAL STATES: ICD-10-CM

## 2024-06-18 DIAGNOSIS — Z87.19 OTHER SPECIFIED POSTPROCEDURAL STATES: ICD-10-CM

## 2024-06-18 DIAGNOSIS — Z98.84 BARIATRIC SURGERY STATUS: ICD-10-CM

## 2024-06-18 PROCEDURE — 99214 OFFICE O/P EST MOD 30 MIN: CPT

## 2024-06-18 RX ORDER — CARIPRAZINE 3 MG/1
3 CAPSULE, GELATIN COATED ORAL
Refills: 0 | Status: DISCONTINUED | COMMUNITY
End: 2024-06-18

## 2024-06-18 RX ORDER — DESVENLAFAXINE 50 MG/1
50 TABLET, EXTENDED RELEASE ORAL
Refills: 0 | Status: DISCONTINUED | COMMUNITY
Start: 2021-08-11 | End: 2024-06-18

## 2024-06-18 RX ORDER — DESVENLAFAXINE 100 MG/1
100 TABLET, EXTENDED RELEASE ORAL
Qty: 30 | Refills: 0 | Status: DISCONTINUED | COMMUNITY
End: 2024-06-18

## 2024-06-24 NOTE — ASSESSMENT
[FreeTextEntry1] : 24-year-old woman ~6 months s/p Laparoscopic Sleeve Gastrectomy with hiatal hernia repair presents for follow-up. 14 lb weight loss since last visit. Nutrition and exercise guidelines reviewed with the patient.   Continue 3 protein-focused meals/day (aim for 60 g - 70 g protein/day); avoid snacking  Protein Source List given to pt at today's visit Encourage zero calorie fluid intake (64 oz/day); try to eliminate artificial sweetener (consider adding fruit like Lemon, Watermelon, Cucumber to water instead) Try to take bariatric vitamins consistently; bariatric vitamin list given to pt at today's visit Increase activity with cardio (aim for 8k-10k steps/day) and start strength training 2-3x/week; various exercises demonstrated at today's visit Use step counter Weigh yourself 1x-2x/week Try the Calm mati or Soothing Pod mati for stress management Follow-up with nutritionist (keep a food log) Follow-up in 1 month with labs (ordered at today's visit; try to complete 1-2 weeks prior to next visit) All questions answered   Call with any questions or concerns   Time before and after visit spent reviewing chart

## 2024-06-24 NOTE — PHYSICAL EXAM
[Normal] : affect appropriate [de-identified] : soft, NT, ND, no evidence of hernia or diastasis, incisions well-healed

## 2024-06-24 NOTE — HISTORY OF PRESENT ILLNESS
[Procedure: ___] : Procedure performed: [unfilled]  [Date of Surgery: ___] : Date of Surgery:   [unfilled] [Surgeon Name:   ___] : Surgeon Name: Dr. BARNHART [Pre-Op Weight ___] : Pre-op weight was [unfilled] lbs [de-identified] : 24-year-old woman ~6 months s/p Laparoscopic Sleeve Gastrectomy with hiatal hernia repair presents for follow-up. 14 lb weight loss since last visit. Reports no abdominal pain, nausea/vomiting, constipation, diarrhea, reflux/heartburn. Patient is trying to eat 3 protein-rich meals/day (wakes at 11 AM; eats lunch ~1 PM; reports nighttime snacking on nuts, fruit, salad, chips, chocolate covered fruit), drinking adequate zero-calorie fluids/day (also using artificial sweetener), not taking bariatric vitamins consistently, and exercising (i.e. walking; does not track steps).   Last Nutrition appt was 5/16/24

## 2024-06-26 ENCOUNTER — NON-APPOINTMENT (OUTPATIENT)
Age: 24
End: 2024-06-26

## 2024-07-03 ENCOUNTER — APPOINTMENT (OUTPATIENT)
Dept: CT IMAGING | Facility: CLINIC | Age: 24
End: 2024-07-03
Payer: COMMERCIAL

## 2024-07-03 ENCOUNTER — OUTPATIENT (OUTPATIENT)
Dept: OUTPATIENT SERVICES | Facility: HOSPITAL | Age: 24
LOS: 1 days | End: 2024-07-03
Payer: COMMERCIAL

## 2024-07-03 DIAGNOSIS — Z90.49 ACQUIRED ABSENCE OF OTHER SPECIFIED PARTS OF DIGESTIVE TRACT: Chronic | ICD-10-CM

## 2024-07-03 DIAGNOSIS — Z90.89 ACQUIRED ABSENCE OF OTHER ORGANS: Chronic | ICD-10-CM

## 2024-07-03 DIAGNOSIS — Z00.8 ENCOUNTER FOR OTHER GENERAL EXAMINATION: ICD-10-CM

## 2024-07-03 DIAGNOSIS — Z98.890 OTHER SPECIFIED POSTPROCEDURAL STATES: Chronic | ICD-10-CM

## 2024-07-03 DIAGNOSIS — E24.9 CUSHING'S SYNDROME, UNSPECIFIED: ICD-10-CM

## 2024-07-03 DIAGNOSIS — E28.2 POLYCYSTIC OVARIAN SYNDROME: ICD-10-CM

## 2024-07-03 PROCEDURE — 74177 CT ABD & PELVIS W/CONTRAST: CPT | Mod: 26

## 2024-07-03 PROCEDURE — 74177 CT ABD & PELVIS W/CONTRAST: CPT

## 2024-07-15 ENCOUNTER — APPOINTMENT (OUTPATIENT)
Dept: BARIATRICS | Facility: CLINIC | Age: 24
End: 2024-07-15
Payer: COMMERCIAL

## 2024-07-15 VITALS
OXYGEN SATURATION: 97 % | BODY MASS INDEX: 41.03 KG/M2 | SYSTOLIC BLOOD PRESSURE: 116 MMHG | HEIGHT: 65 IN | TEMPERATURE: 97.5 F | DIASTOLIC BLOOD PRESSURE: 88 MMHG | WEIGHT: 246.25 LBS | HEART RATE: 97 BPM

## 2024-07-15 DIAGNOSIS — Z98.84 BARIATRIC SURGERY STATUS: ICD-10-CM

## 2024-07-15 DIAGNOSIS — E66.01 MORBID (SEVERE) OBESITY DUE TO EXCESS CALORIES: ICD-10-CM

## 2024-07-15 PROCEDURE — 99214 OFFICE O/P EST MOD 30 MIN: CPT

## 2024-07-15 RX ORDER — SERTRALINE HYDROCHLORIDE 25 MG/1
25 TABLET, FILM COATED ORAL DAILY
Refills: 0 | Status: ACTIVE | COMMUNITY

## 2024-07-15 RX ORDER — METFORMIN HYDROCHLORIDE 500 MG/1
500 TABLET, COATED ORAL DAILY
Refills: 0 | Status: ACTIVE | COMMUNITY

## 2024-09-05 NOTE — ED ADULT TRIAGE NOTE - INTERNATIONAL TRAVEL
09/05/2024  Vijaya Montenegro is a 56 y.o., female.    Procedure: EGD (ESOPHAGOGASTRODUODENOSCOPY) (N/A)         Patient Active Problem List   Diagnosis    Essential hypertension    Anxiety    Gastroesophageal reflux disease without esophagitis    Overweight (BMI 25.0-29.9)    Hyperlipidemia with target LDL less than 100    Type 2 diabetes mellitus with proteinuria    Gout, arthritis    Optic disc hemorrhage, left    Atrophic vaginitis    Celiac disease    Osteopenia after menopause       Past Medical History:   Diagnosis Date    Anxiety     Celiac disease 9/20/2021    GERD (gastroesophageal reflux disease)     Gout, arthritis     H. pylori infection 2021    treated    Hyperlipidemia LDL goal < 100     Hypertension     Obesity     Type II or unspecified type diabetes mellitus without mention of complication, not stated as uncontrolled     diet controlled       ECHO: No results found for this or any previous visit.      There is no height or weight on file to calculate BMI.    Tobacco Use: Low Risk  (4/22/2024)    Patient History     Smoking Tobacco Use: Never     Smokeless Tobacco Use: Never     Passive Exposure: Not on file       Social History     Substance and Sexual Activity   Drug Use No        Alcohol Use: Not At Risk (10/11/2023)    AUDIT-C     Frequency of Alcohol Consumption: Never     Average Number of Drinks: Patient does not drink     Frequency of Binge Drinking: Never       Review of patient's allergies indicates:   Allergen Reactions    Codeine Other (See Comments)         Airway:  No value filed.    Pre-op Assessment    I have reviewed the Patient Summary Reports.    I have reviewed the NPO Status.   I have reviewed the Medications.     Review of Systems  Anesthesia Hx:             Denies Family Hx of Anesthesia complications.    Denies Personal Hx of Anesthesia  complications.                    Hematology/Oncology:  Hematology Normal   Oncology Normal                                   EENT/Dental:  EENT/Dental Normal           Cardiovascular:     Hypertension                                        Pulmonary:  Pulmonary Normal                       Renal/:  Renal/ Normal                 Hepatic/GI:     GERD             Musculoskeletal:  Arthritis               Neurological:  Neurology Normal                                      Endocrine:  Diabetes           Dermatological:  Skin Normal    Psych:  Psychiatric Normal                  Physical Exam    Airway:  Mallampati: II     Anesthesia Plan  Type of Anesthesia, risks & benefits discussed:    Anesthesia Type: Gen Natural Airway  Intra-op Monitoring Plan: Standard ASA Monitors  Post Op Pain Control Plan: multimodal analgesia  Induction:  IV  Informed Consent: Informed consent signed with the Patient and all parties understand the risks and agree with anesthesia plan.  All questions answered.   ASA Score: 3  Day of Surgery Review of History & Physical: H&P Update referred to the surgeon/provider.    Ready For Surgery From Anesthesia Perspective.     .       No

## 2024-10-09 NOTE — DISCHARGE NOTE PROVIDER - NSDCCPGOAL_GEN_ALL_CORE_FT
-Responded to overhead medical alert called to    -Upon writer's arrival, pt laying prone on the floor, other office medical staff preparing to check vitals   -Informed by MA who witnessed the incident that pt had fallen while walking with his cane and was unable to stand back up  -Vitals returned with SpO2 100%, HR 79  -An ambulance was called, while waiting for arrival, assisted with lifting pt onto a wheelchair  -PCP arrived and pt was transferred to an exam room in wheelchair  with PCP   
To get better and follow your care plan as instructed.

## 2024-11-05 ENCOUNTER — APPOINTMENT (OUTPATIENT)
Dept: BARIATRICS | Facility: CLINIC | Age: 24
End: 2024-11-05

## 2024-11-26 NOTE — H&P PST ADULT - NEGATIVE NEUROLOGICAL SYMPTOMS
Uncertain of timing of this diagnosis or treatment regimen  Need to clarify if pt is on CPAP at home       no weakness

## 2024-12-09 ENCOUNTER — NON-APPOINTMENT (OUTPATIENT)
Age: 24
End: 2024-12-09

## 2024-12-13 ENCOUNTER — APPOINTMENT (OUTPATIENT)
Dept: INTERNAL MEDICINE | Facility: CLINIC | Age: 24
End: 2024-12-13
Payer: COMMERCIAL

## 2024-12-13 VITALS
SYSTOLIC BLOOD PRESSURE: 124 MMHG | TEMPERATURE: 98.1 F | OXYGEN SATURATION: 99 % | RESPIRATION RATE: 14 BRPM | HEART RATE: 108 BPM | DIASTOLIC BLOOD PRESSURE: 88 MMHG

## 2024-12-13 DIAGNOSIS — Z11.3 ENCOUNTER FOR SCREENING FOR INFECTIONS WITH A PREDOMINANTLY SEXUAL MODE OF TRANSMISSION: ICD-10-CM

## 2024-12-13 PROCEDURE — 99213 OFFICE O/P EST LOW 20 MIN: CPT

## 2024-12-14 PROBLEM — Z11.3 SCREENING FOR STD (SEXUALLY TRANSMITTED DISEASE): Status: RESOLVED | Noted: 2020-12-16 | Resolved: 2024-12-13

## 2024-12-17 LAB
C TRACH RRNA SPEC QL NAA+PROBE: NOT DETECTED
HBV SURFACE AB SER QL: REACTIVE
HBV SURFACE AG SER QL: NONREACTIVE
HCV AB SER QL: NONREACTIVE
HCV S/CO RATIO: 0.13 S/CO
HIV1+2 AB SPEC QL IA.RAPID: NONREACTIVE
HSV 1+2 IGG SER IA-IMP: POSITIVE
HSV 1+2 IGG SER IA-IMP: POSITIVE
HSV1 IGG SER QL: 29.2 INDEX
HSV2 IGG SER QL: 3.16 INDEX
N GONORRHOEA RRNA SPEC QL NAA+PROBE: NOT DETECTED
SOURCE AMPLIFICATION: NORMAL
T PALLIDUM AB SER QL IA: NEGATIVE

## 2024-12-17 RX ORDER — VALACYCLOVIR 500 MG/1
500 TABLET, FILM COATED ORAL DAILY
Qty: 30 | Refills: 5 | Status: ACTIVE | COMMUNITY
Start: 2024-12-17 | End: 1900-01-01

## 2024-12-17 NOTE — ED ADULT TRIAGE NOTE - AS HEIGHT TYPE
The following letter pertains to your most recent diagnostic tests:    The hemoglobin A1c has improved since last check.  It remains above your goal of hemoglobin A1c less than 8.      I recommend further increasing NPH insulin dose from 20 unites twice daily to 22 units twice daily and rechecking the hemoglobin A1c after March 17th 2025.   You should schedule a lab appointment for that purpose.            Sincerely,    Dr. Gonsalez stated

## 2024-12-18 NOTE — ED PROVIDER NOTE - GENITOURINARY NEGATIVE STATEMENT, MLM
P Percocet 10-3 25 4 times a day was refilled helping patient function carry out day-to-day activities and work full-time patient's work does involve physical weight lifting and extensive walking remaining follow-up plan finding as follows from a previous progress note      All medical records reviewed and reconciled patient PDMP database reviewed to ensure compliant with the treatment plan for pain management benefits due to the fact that patient has not responded to other measures for pain control so far and severe the pain is significant and interfering with normal daily activities I believe it is reasonable and appropriate to continue the controlled substance in order to improve his ability to function in enhance quality of life the patient has signed a narcotic agreement patient should it was utilize 1 pharmacy and not receiving narcotic from any other provider patient verbalizes understanding that breaching the contract will affect future prescription decision making and critical results in the dismissal from the practice if specifically the has been explained in understood to patient patient demonstrates following informed use of appropriate medicine to analgesia increase activity explained the side effects recommended that thisto the patient patient understands that also advised that appeared to expose him order an abuse and addiction and overdose counseling provided for prevention of any overuse abuse or addiction  
Same is under lumbar disc herniationComplains of intractable lower back pain radiating both lower extremity tingling numbness with intermittent weakness causing difficulty ambulating on Percocet 10-3 25 4 times a day was refilled further management same as under lumbar spine stenosis and lumbar disc herniation  
Seen by urology was prescribed Elimite cream lidocaine 2.5% with with Xylocaine 2.5% to use as directed  
no dysuria, no frequency, and no hematuria.

## 2024-12-31 NOTE — ED PROVIDER NOTE - NS HIV RISK FACTOR YES
54-year-old female with history of SVT in the past Which she states was several years ago, hypothyroidism presents for sudden onset of palpitations described as heart racing that began at 8 PM last night.  Patient states that this occurred at rest.  Patient notes that her "heart is pounding" but denies any pressure or pain.  Patient denies any nausea or vomiting.  Patient has had a cough for the last few days and tested positive for RSV.  Patient was concerned that cough medicine she was taking which may have  contain phenylephrine might lead to the  palpitations.  Patient was found to be in SVT with a heart rate of 178.  Attempted to convert to sinus rhythm using vagal maneuvers at bedside which were unsuccessful.  Will get cardiac workup and administer adenosine starting with a dose of 6 mg and if unsuccessful will give a dose of 12 mg IV. Declined

## 2025-01-10 ENCOUNTER — NON-APPOINTMENT (OUTPATIENT)
Age: 25
End: 2025-01-10

## 2025-02-25 ENCOUNTER — RX RENEWAL (OUTPATIENT)
Age: 25
End: 2025-02-25

## 2025-02-27 NOTE — ED PROVIDER NOTE - SKIN, MLM
She is doing well and is active.  The stroke was in 2021 and is stable.  No angina and no chf.   She is on the eliquis    
The BP is doing well  
Skin normal color for race, warm, dry and intact. No evidence of rash.

## 2025-03-19 DIAGNOSIS — Z79.899 OTHER LONG TERM (CURRENT) DRUG THERAPY: ICD-10-CM

## 2025-03-19 DIAGNOSIS — R79.9 ABNORMAL FINDING OF BLOOD CHEMISTRY, UNSPECIFIED: ICD-10-CM

## 2025-03-19 DIAGNOSIS — Z00.00 ENCOUNTER FOR GENERAL ADULT MEDICAL EXAMINATION W/OUT ABNORMAL FINDINGS: ICD-10-CM

## 2025-03-19 DIAGNOSIS — Z32.00 ENCOUNTER FOR PREGNANCY TEST, RESULT UNKNOWN: ICD-10-CM

## 2025-03-19 DIAGNOSIS — Z90.3 POSTSURGICAL MALABSORPTION, NOT ELSEWHERE CLASSIFIED: ICD-10-CM

## 2025-03-19 DIAGNOSIS — E61.8 DEFICIENCY OF OTHER SPECIFIED NUTRIENT ELEMENTS: ICD-10-CM

## 2025-03-19 DIAGNOSIS — Z01.812 ENCOUNTER FOR PREPROCEDURAL LABORATORY EXAMINATION: ICD-10-CM

## 2025-03-19 DIAGNOSIS — Z87.19 OTHER SPECIFIED POSTPROCEDURAL STATES: ICD-10-CM

## 2025-03-19 DIAGNOSIS — R63.5 ABNORMAL WEIGHT GAIN: ICD-10-CM

## 2025-03-19 DIAGNOSIS — R63.2 POLYPHAGIA: ICD-10-CM

## 2025-03-19 DIAGNOSIS — K91.2 POSTSURGICAL MALABSORPTION, NOT ELSEWHERE CLASSIFIED: ICD-10-CM

## 2025-03-19 DIAGNOSIS — Z86.39 PERSONAL HISTORY OF OTHER ENDOCRINE, NUTRITIONAL AND METABOLIC DISEASE: ICD-10-CM

## 2025-03-19 DIAGNOSIS — R63.8 OTHER SYMPTOMS AND SIGNS CONCERNING FOOD AND FLUID INTAKE: ICD-10-CM

## 2025-03-19 DIAGNOSIS — E56.9 VITAMIN DEFICIENCY, UNSPECIFIED: ICD-10-CM

## 2025-03-19 DIAGNOSIS — E46 UNSPECIFIED PROTEIN-CALORIE MALNUTRITION: ICD-10-CM

## 2025-03-19 DIAGNOSIS — Z98.890 OTHER SPECIFIED POSTPROCEDURAL STATES: ICD-10-CM

## 2025-03-27 LAB
25(OH)D3 SERPL-MCNC: 41.1 NG/ML
ALBUMIN SERPL ELPH-MCNC: 4.6 G/DL
ALP BLD-CCNC: 54 U/L
ALT SERPL-CCNC: 67 U/L
ANION GAP SERPL CALC-SCNC: 12 MMOL/L
AST SERPL-CCNC: 29 U/L
BASOPHILS # BLD AUTO: 0.04 K/UL
BASOPHILS NFR BLD AUTO: 0.3 %
BILIRUB SERPL-MCNC: 0.4 MG/DL
BUN SERPL-MCNC: 15 MG/DL
CALCIUM SERPL-MCNC: 9.9 MG/DL
CHLORIDE SERPL-SCNC: 101 MMOL/L
CHOLEST SERPL-MCNC: 150 MG/DL
CO2 SERPL-SCNC: 24 MMOL/L
CREAT SERPL-MCNC: 0.78 MG/DL
EGFRCR SERPLBLD CKD-EPI 2021: 107 ML/MIN/1.73M2
EOSINOPHIL # BLD AUTO: 0.08 K/UL
EOSINOPHIL NFR BLD AUTO: 0.6 %
ESTIMATED AVERAGE GLUCOSE: 97 MG/DL
FOLATE SERPL-MCNC: 12.4 NG/ML
GLUCOSE SERPL-MCNC: 85 MG/DL
HBA1C MFR BLD HPLC: 5 %
HCG SERPL-MCNC: <1 MIU/ML
HCT VFR BLD CALC: 34 %
HDLC SERPL-MCNC: 48 MG/DL
HGB BLD-MCNC: 11.1 G/DL
IMM GRANULOCYTES NFR BLD AUTO: 0.5 %
IRON SERPL-MCNC: 91 UG/DL
LDLC SERPL-MCNC: 80 MG/DL
LYMPHOCYTES # BLD AUTO: 3.19 K/UL
LYMPHOCYTES NFR BLD AUTO: 25.8 %
MAN DIFF?: NORMAL
MCHC RBC-ENTMCNC: 31.3 PG
MCHC RBC-ENTMCNC: 32.6 G/DL
MCV RBC AUTO: 95.8 FL
MONOCYTES # BLD AUTO: 0.78 K/UL
MONOCYTES NFR BLD AUTO: 6.3 %
NEUTROPHILS # BLD AUTO: 8.22 K/UL
NEUTROPHILS NFR BLD AUTO: 66.5 %
NONHDLC SERPL-MCNC: 103 MG/DL
PLATELET # BLD AUTO: 272 K/UL
POTASSIUM SERPL-SCNC: 4.4 MMOL/L
PREALB SERPL NEPH-MCNC: 30 MG/DL
PROT SERPL-MCNC: 7.3 G/DL
RBC # BLD: 3.55 M/UL
RBC # FLD: 12.8 %
SODIUM SERPL-SCNC: 137 MMOL/L
TRIGL SERPL-MCNC: 130 MG/DL
TSH SERPL-ACNC: 1.5 UIU/ML
VIT B12 SERPL-MCNC: 483 PG/ML
WBC # FLD AUTO: 12.37 K/UL

## 2025-03-28 ENCOUNTER — NON-APPOINTMENT (OUTPATIENT)
Age: 25
End: 2025-03-28

## 2025-03-31 ENCOUNTER — NON-APPOINTMENT (OUTPATIENT)
Age: 25
End: 2025-03-31

## 2025-04-01 ENCOUNTER — APPOINTMENT (OUTPATIENT)
Dept: BARIATRICS | Facility: CLINIC | Age: 25
End: 2025-04-01

## 2025-04-01 VITALS
SYSTOLIC BLOOD PRESSURE: 124 MMHG | TEMPERATURE: 98 F | HEART RATE: 99 BPM | WEIGHT: 215.39 LBS | OXYGEN SATURATION: 95 % | DIASTOLIC BLOOD PRESSURE: 87 MMHG | HEIGHT: 65 IN | BODY MASS INDEX: 35.89 KG/M2

## 2025-04-01 PROCEDURE — G2211 COMPLEX E/M VISIT ADD ON: CPT | Mod: NC

## 2025-04-01 PROCEDURE — 99215 OFFICE O/P EST HI 40 MIN: CPT

## 2025-04-01 RX ORDER — VALSARTAN 80 MG/1
80 TABLET, COATED ORAL DAILY
Refills: 0 | Status: ACTIVE | COMMUNITY

## 2025-04-01 RX ORDER — TIRZEPATIDE 12.5 MG/.5ML
12.5 INJECTION, SOLUTION SUBCUTANEOUS
Refills: 0 | Status: ACTIVE | COMMUNITY

## 2025-04-01 RX ORDER — GABAPENTIN 300 MG
300 TABLET ORAL DAILY
Refills: 0 | Status: ACTIVE | COMMUNITY

## 2025-04-01 RX ORDER — LISDEXAMFETAMINE DIMESYLATE 10 MG/1
10 CAPSULE ORAL DAILY
Refills: 0 | Status: ACTIVE | COMMUNITY

## 2025-05-07 ENCOUNTER — APPOINTMENT (OUTPATIENT)
Dept: INTERNAL MEDICINE | Facility: CLINIC | Age: 25
End: 2025-05-07
Payer: COMMERCIAL

## 2025-05-07 VITALS
TEMPERATURE: 98.4 F | OXYGEN SATURATION: 97 % | BODY MASS INDEX: 33.32 KG/M2 | SYSTOLIC BLOOD PRESSURE: 126 MMHG | HEIGHT: 65 IN | DIASTOLIC BLOOD PRESSURE: 80 MMHG | RESPIRATION RATE: 16 BRPM | HEART RATE: 92 BPM | WEIGHT: 200 LBS

## 2025-05-07 DIAGNOSIS — Z11.3 ENCOUNTER FOR SCREENING FOR INFECTIONS WITH A PREDOMINANTLY SEXUAL MODE OF TRANSMISSION: ICD-10-CM

## 2025-05-07 PROCEDURE — 99214 OFFICE O/P EST MOD 30 MIN: CPT

## 2025-05-08 DIAGNOSIS — E83.52 HYPERCALCEMIA: ICD-10-CM

## 2025-05-11 LAB
ALBUMIN SERPL ELPH-MCNC: 5.4 G/DL
ALP BLD-CCNC: 60 U/L
ALT SERPL-CCNC: 120 U/L
ANION GAP SERPL CALC-SCNC: 17 MMOL/L
AST SERPL-CCNC: 36 U/L
BASOPHILS # BLD AUTO: 0.04 K/UL
BASOPHILS NFR BLD AUTO: 0.3 %
BILIRUB SERPL-MCNC: 0.5 MG/DL
BUN SERPL-MCNC: 8 MG/DL
C TRACH RRNA SPEC QL NAA+PROBE: NOT DETECTED
CALCIUM SERPL-MCNC: 10.9 MG/DL
CALCIUM SERPL-MCNC: 11 MG/DL
CHLORIDE SERPL-SCNC: 98 MMOL/L
CO2 SERPL-SCNC: 23 MMOL/L
CREAT SERPL-MCNC: 0.82 MG/DL
EGFRCR SERPLBLD CKD-EPI 2021: 101 ML/MIN/1.73M2
EOSINOPHIL # BLD AUTO: 0.08 K/UL
EOSINOPHIL NFR BLD AUTO: 0.6 %
GLUCOSE SERPL-MCNC: 78 MG/DL
HCT VFR BLD CALC: 39.5 %
HCV AB SER QL: NONREACTIVE
HCV S/CO RATIO: 0.18 S/CO
HGB BLD-MCNC: 13.1 G/DL
HIV1+2 AB SPEC QL IA.RAPID: NONREACTIVE
HSV 1+2 IGG SER IA-IMP: POSITIVE
HSV 1+2 IGG SER IA-IMP: POSITIVE
HSV1 IGG SER QL: 31.1 INDEX
HSV2 IGG SER QL: 3.67 INDEX
IMM GRANULOCYTES NFR BLD AUTO: 0.3 %
LYMPHOCYTES # BLD AUTO: 3.56 K/UL
LYMPHOCYTES NFR BLD AUTO: 27.6 %
MAN DIFF?: NORMAL
MCHC RBC-ENTMCNC: 31.5 PG
MCHC RBC-ENTMCNC: 33.2 G/DL
MCV RBC AUTO: 95 FL
MONOCYTES # BLD AUTO: 0.77 K/UL
MONOCYTES NFR BLD AUTO: 6 %
N GONORRHOEA RRNA SPEC QL NAA+PROBE: NOT DETECTED
NEUTROPHILS # BLD AUTO: 8.43 K/UL
NEUTROPHILS NFR BLD AUTO: 65.2 %
PARATHYROID HORMONE INTACT: 11 PG/ML
PLATELET # BLD AUTO: 259 K/UL
POTASSIUM SERPL-SCNC: 4.2 MMOL/L
PROT SERPL-MCNC: 8.8 G/DL
RBC # BLD: 4.16 M/UL
RBC # FLD: 13.1 %
SODIUM SERPL-SCNC: 138 MMOL/L
SOURCE AMPLIFICATION: NORMAL
T PALLIDUM AB SER QL IA: NEGATIVE
WBC # FLD AUTO: 12.92 K/UL

## 2025-05-21 ENCOUNTER — RX RENEWAL (OUTPATIENT)
Age: 25
End: 2025-05-21

## 2025-05-25 PROBLEM — Z29.81 ENCOUNTER FOR HIV PRE-EXPOSURE PROPHYLAXIS: Status: ACTIVE | Noted: 2025-05-25

## 2025-07-15 ENCOUNTER — APPOINTMENT (OUTPATIENT)
Dept: BARIATRICS | Facility: CLINIC | Age: 25
End: 2025-07-15

## 2025-07-15 VITALS
HEIGHT: 65 IN | SYSTOLIC BLOOD PRESSURE: 126 MMHG | OXYGEN SATURATION: 98 % | HEART RATE: 74 BPM | TEMPERATURE: 97.8 F | BODY MASS INDEX: 29.82 KG/M2 | DIASTOLIC BLOOD PRESSURE: 80 MMHG | WEIGHT: 179 LBS

## 2025-07-15 PROCEDURE — 99214 OFFICE O/P EST MOD 30 MIN: CPT

## 2025-07-15 RX ORDER — AMLODIPINE BESYLATE 5 MG/1
5 TABLET ORAL
Refills: 0 | Status: ACTIVE | COMMUNITY

## 2025-07-15 RX ORDER — GABAPENTIN 600 MG/1
600 TABLET, COATED ORAL
Refills: 0 | Status: ACTIVE | COMMUNITY

## 2025-08-27 PROBLEM — E66.3 OVERWEIGHT: Status: ACTIVE | Noted: 2025-08-27

## 2025-08-29 ENCOUNTER — APPOINTMENT (OUTPATIENT)
Dept: INTERNAL MEDICINE | Facility: CLINIC | Age: 25
End: 2025-08-29
Payer: COMMERCIAL

## 2025-08-29 VITALS
HEART RATE: 78 BPM | RESPIRATION RATE: 16 BRPM | TEMPERATURE: 97.9 F | SYSTOLIC BLOOD PRESSURE: 120 MMHG | DIASTOLIC BLOOD PRESSURE: 74 MMHG | BODY MASS INDEX: 29.32 KG/M2 | OXYGEN SATURATION: 99 % | HEIGHT: 65 IN | WEIGHT: 176 LBS

## 2025-08-29 DIAGNOSIS — I10 ESSENTIAL (PRIMARY) HYPERTENSION: ICD-10-CM

## 2025-08-29 DIAGNOSIS — Z00.00 ENCOUNTER FOR GENERAL ADULT MEDICAL EXAMINATION W/OUT ABNORMAL FINDINGS: ICD-10-CM

## 2025-08-29 DIAGNOSIS — Z29.81 ENCOUNTER FOR HIV PRE-EXPOSURE PROPHYLAXIS: ICD-10-CM

## 2025-08-29 PROCEDURE — 36415 COLL VENOUS BLD VENIPUNCTURE: CPT

## 2025-08-29 PROCEDURE — 99395 PREV VISIT EST AGE 18-39: CPT

## 2025-08-30 DIAGNOSIS — D64.9 ANEMIA, UNSPECIFIED: ICD-10-CM

## 2025-08-30 LAB
ALBUMIN SERPL ELPH-MCNC: 4.4 G/DL
ALP BLD-CCNC: 48 U/L
ALT SERPL-CCNC: 54 U/L
ANION GAP SERPL CALC-SCNC: 12 MMOL/L
AST SERPL-CCNC: 29 U/L
BASOPHILS # BLD AUTO: 0.04 K/UL
BASOPHILS NFR BLD AUTO: 0.4 %
BILIRUB SERPL-MCNC: 0.3 MG/DL
BUN SERPL-MCNC: 8 MG/DL
CALCIUM SERPL-MCNC: 10.1 MG/DL
CHLORIDE SERPL-SCNC: 102 MMOL/L
CHOLEST SERPL-MCNC: 148 MG/DL
CK SERPL-CCNC: 146 U/L
CO2 SERPL-SCNC: 26 MMOL/L
CREAT SERPL-MCNC: 0.69 MG/DL
EGFRCR SERPLBLD CKD-EPI 2021: 123 ML/MIN/1.73M2
EOSINOPHIL # BLD AUTO: 0.18 K/UL
EOSINOPHIL NFR BLD AUTO: 1.7 %
ESTIMATED AVERAGE GLUCOSE: 97 MG/DL
GLUCOSE SERPL-MCNC: 92 MG/DL
HBA1C MFR BLD HPLC: 5 %
HCT VFR BLD CALC: 33 %
HCV AB SER QL: NONREACTIVE
HCV S/CO RATIO: 0.12 S/CO
HDLC SERPL-MCNC: 45 MG/DL
HGB BLD-MCNC: 10.8 G/DL
HIV1+2 AB SPEC QL IA.RAPID: NONREACTIVE
IMM GRANULOCYTES NFR BLD AUTO: 0.3 %
LDLC SERPL-MCNC: 82 MG/DL
LYMPHOCYTES # BLD AUTO: 3.81 K/UL
LYMPHOCYTES NFR BLD AUTO: 35.1 %
MAN DIFF?: NORMAL
MCHC RBC-ENTMCNC: 32.7 G/DL
MCHC RBC-ENTMCNC: 33.4 PG
MCV RBC AUTO: 102.2 FL
MONOCYTES # BLD AUTO: 0.67 K/UL
MONOCYTES NFR BLD AUTO: 6.2 %
NEUTROPHILS # BLD AUTO: 6.13 K/UL
NEUTROPHILS NFR BLD AUTO: 56.3 %
NONHDLC SERPL-MCNC: 103 MG/DL
PLATELET # BLD AUTO: 297 K/UL
POTASSIUM SERPL-SCNC: 4.3 MMOL/L
PROT SERPL-MCNC: 6.9 G/DL
RBC # BLD: 3.23 M/UL
RBC # FLD: 13.5 %
SODIUM SERPL-SCNC: 140 MMOL/L
T PALLIDUM AB SER QL IA: NEGATIVE
TRIGL SERPL-MCNC: 112 MG/DL
TSH SERPL-ACNC: 0.63 UIU/ML
WBC # FLD AUTO: 10.86 K/UL

## 2025-08-31 LAB
FERRITIN SERPL-MCNC: 45 NG/ML
FOLATE SERPL-MCNC: 5.1 NG/ML
IRON SATN MFR SERPL: 24 %
IRON SERPL-MCNC: 72 UG/DL
TIBC SERPL-MCNC: 293 UG/DL
UIBC SERPL-MCNC: 221 UG/DL
VIT B12 SERPL-MCNC: 366 PG/ML

## 2025-09-01 LAB
C TRACH RRNA SPEC QL NAA+PROBE: NOT DETECTED
N GONORRHOEA RRNA SPEC QL NAA+PROBE: NOT DETECTED
SOURCE AMPLIFICATION: NORMAL

## 2025-09-08 ENCOUNTER — APPOINTMENT (OUTPATIENT)
Dept: INTERNAL MEDICINE | Facility: CLINIC | Age: 25
End: 2025-09-08

## 2025-09-08 VITALS
RESPIRATION RATE: 16 BRPM | WEIGHT: 177 LBS | SYSTOLIC BLOOD PRESSURE: 124 MMHG | TEMPERATURE: 97.8 F | OXYGEN SATURATION: 99 % | HEIGHT: 65 IN | DIASTOLIC BLOOD PRESSURE: 70 MMHG | BODY MASS INDEX: 29.49 KG/M2 | HEART RATE: 55 BPM

## 2025-09-11 DIAGNOSIS — Z23 ENCOUNTER FOR IMMUNIZATION: ICD-10-CM

## 2025-09-12 ENCOUNTER — APPOINTMENT (OUTPATIENT)
Dept: INTERNAL MEDICINE | Facility: CLINIC | Age: 25
End: 2025-09-12
Payer: COMMERCIAL

## 2025-09-12 PROCEDURE — 90651 9VHPV VACCINE 2/3 DOSE IM: CPT

## 2025-09-12 PROCEDURE — 90471 IMMUNIZATION ADMIN: CPT

## (undated) DEVICE — TRAP QUICK CATCH  SINGL CHAMBER

## (undated) DEVICE — ELCTR BOVIE TIP BLADE INSULATED 2.75" EDGE

## (undated) DEVICE — MARKER ENDO SPOT EX

## (undated) DEVICE — TROCAR COVIDIEN VISIPORT PLUS 5MM-12MM WITH FIXATION CANNULA

## (undated) DEVICE — WARMING BLANKET UPPER ADULT

## (undated) DEVICE — TUBE O2 SUPL CRUSH RESIS CONN SOUTHSIDE ONLY

## (undated) DEVICE — TUBING STRYKER PNEUMOSURE HI FLOW RTP

## (undated) DEVICE — SYR LUER LOK 30CC

## (undated) DEVICE — SOL IRR BAG NS 0.9% 3000ML

## (undated) DEVICE — STERIS DEFENDO 3-PIECE KIT (AIR/WATER, SUCTION & BIOPSY VALVES)

## (undated) DEVICE — TUBING IV SET GRAVITY 3Y 100" MACRO

## (undated) DEVICE — CATH IV SAFE BC 22G X 1" (BLUE)

## (undated) DEVICE — ELCTR GROUNDING PAD ADULT COVIDIEN

## (undated) DEVICE — SHEARS COVIDIEN ENDO SHEAR 5MM X 45CM LONG W MONOPOLAR CAUTERY

## (undated) DEVICE — SOL IRR POUR H2O 500ML

## (undated) DEVICE — FORMALIN CUPS 10% BUFFERED

## (undated) DEVICE — SHEARS HARMONIC ACE 5MM X 36CM CURVED TIP

## (undated) DEVICE — PROBE FIAPC DIA 2.3MM/7FR LNTH 220CM/7.2FT

## (undated) DEVICE — SUT POLYSORB 0 30" GU-46

## (undated) DEVICE — SMOKE EVACUTATION SYS LAPROSCOPIC AC/PA

## (undated) DEVICE — FORCEP RADIAL JAW 4 W NDL 2.2MM 2.8MM 160CM YELLOW DISP

## (undated) DEVICE — TUBING SUCTION CONN 6FT STERILE

## (undated) DEVICE — SUT SOFSILK 0 30" V-20

## (undated) DEVICE — SNARE POLYP SENS 27MM 240CM

## (undated) DEVICE — STAPLER COVIDIEN ENDO GIA XL HANDLE

## (undated) DEVICE — TROCAR ETHICON ENDOPATH XCEL BLADELESS 15MM X 100MM STABILITY

## (undated) DEVICE — RADIAL JAW 4 LG CAPACITY WITH NDL

## (undated) DEVICE — SYR LUER SLIP TIP 30CC

## (undated) DEVICE — SHEARS COVIDIEN ENDO SHEAR 5MM X 31CM W UNIPOLAR CAUTERY

## (undated) DEVICE — MASK O2 NON REBREATH 3IN1 ADULT

## (undated) DEVICE — BLADE SCALPEL SAFETYLOCK #15

## (undated) DEVICE — ENDOCATCH II 15MM

## (undated) DEVICE — VENODYNE/SCD SLEEVE CALF MEDIUM

## (undated) DEVICE — DRAPE 3/4 SHEET 52X76"

## (undated) DEVICE — SUT POLYSORB 3-0 30" V-20

## (undated) DEVICE — SOL IRR NS 0.9% 250ML

## (undated) DEVICE — SUCTION YANKAUER TAPERED BULBOUS NO VENT

## (undated) DEVICE — GLV 6.5 PROTEXIS (WHITE)

## (undated) DEVICE — SENSOR O2 FINGER XL ADULT 24/BX 6BX/CA

## (undated) DEVICE — BRUSH CYTO ENDO

## (undated) DEVICE — SUT MAXON 4-0 30" P-12

## (undated) DEVICE — BRUSH COLONOSCOPY CYTOLOGY

## (undated) DEVICE — SYR LUER LOK 10CC

## (undated) DEVICE — DRAPE TOWEL BLUE 17" X 24"

## (undated) DEVICE — CATH ELCTR GLIDE PRB 7FR

## (undated) DEVICE — TROCAR COVIDIEN ENDO CLOSE SUTURING DEVICE

## (undated) DEVICE — DRSG CURITY GAUZE SPONGE 4 X 4" 12-PLY

## (undated) DEVICE — TROCAR ETHICON ENDOPATH XCEL BLADELESS 5MM X 100MM STABILITY

## (undated) DEVICE — TUBING IV SET SECOND 34" W/O LOK-BLUNT

## (undated) DEVICE — TUBING STRYKEFLOW II SUCTION / IRRIGATOR

## (undated) DEVICE — CANISTER SUCTION 1200CC 10/SL

## (undated) DEVICE — NDL HYPO REGULAR BEVEL 22G X 1.5" (TURQUOISE)

## (undated) DEVICE — CATH ELECHMSTAT  INJ 7FR 210CM

## (undated) DEVICE — BITE BLOCK MAXI RUBBER STAMP

## (undated) DEVICE — PACK GENERAL LAPAROSCOPY

## (undated) DEVICE — CATH IV SAFE BC 20G X 1.16" (PINK)

## (undated) DEVICE — PACK IV START WITH CHG

## (undated) DEVICE — LIGASURE MARYLAND 37CM

## (undated) DEVICE — SYR IV POSIFLUSH NS 3ML 30/TY

## (undated) DEVICE — SET IV PUMP BLOOD 1VALVE 180FILTER NON-DEHP

## (undated) DEVICE — ELCTR STRYKER NEPTUNE SMOKE EVACUATION PENCIL (GREEN)

## (undated) DEVICE — D HELP - CLEARVIEW CLEARIFY SYSTEM

## (undated) DEVICE — FOLEY TRAY 14FR 5CC LTX BAG CLOSED

## (undated) DEVICE — TUBING CANNULA SALTER LABS NASAL ADULT 7FT

## (undated) DEVICE — SYR ALLIANCE II INFLATION 60ML

## (undated) DEVICE — VALVE BIOPSY

## (undated) DEVICE — Device

## (undated) DEVICE — NDL INJ SCLERO INTERJECT 23G